# Patient Record
Sex: FEMALE | Race: BLACK OR AFRICAN AMERICAN | NOT HISPANIC OR LATINO | Employment: UNEMPLOYED | URBAN - METROPOLITAN AREA
[De-identification: names, ages, dates, MRNs, and addresses within clinical notes are randomized per-mention and may not be internally consistent; named-entity substitution may affect disease eponyms.]

---
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
## 2020-01-27 ENCOUNTER — HOSPITAL ENCOUNTER (EMERGENCY)
Facility: HOSPITAL | Age: 20
Discharge: HOME | End: 2020-01-27
Attending: EMERGENCY MEDICINE | Admitting: EMERGENCY MEDICINE

## 2020-01-27 VITALS
DIASTOLIC BLOOD PRESSURE: 58 MMHG | BODY MASS INDEX: 19.94 KG/M2 | SYSTOLIC BLOOD PRESSURE: 107 MMHG | HEIGHT: 60 IN | RESPIRATION RATE: 16 BRPM | WEIGHT: 101.6 LBS | HEART RATE: 72 BPM | TEMPERATURE: 97.6 F | OXYGEN SATURATION: 99 %

## 2020-01-27 DIAGNOSIS — N39.0 URINARY TRACT INFECTION WITHOUT HEMATURIA, SITE UNSPECIFIED: Primary | ICD-10-CM

## 2020-01-27 LAB
AMORPH URATE CRY URNS QL MICRO: ABNORMAL /HPF
B-HCG UR QL: NEGATIVE
BACTERIA UR QL AUTO: ABNORMAL /HPF
BILIRUB UR QL STRIP: NEGATIVE
CLARITY UR: CLEAR
COLOR UR: YELLOW
GLUCOSE UR STRIP-MCNC: NEGATIVE MG/DL
HGB UR QL STRIP.AUTO: ABNORMAL
HYALINE CASTS UR QL AUTO: ABNORMAL /LPF
KETONES UR QL STRIP: NEGATIVE
LEUKOCYTE ESTERASE UR QL STRIP.AUTO: ABNORMAL
NITRITE UR QL STRIP: NEGATIVE
PH UR STRIP.AUTO: <=5 [PH] (ref 4.5–8)
PROT UR QL STRIP: NEGATIVE
RBC # UR: ABNORMAL /HPF
REF LAB TEST METHOD: ABNORMAL
SP GR UR STRIP: <=1.005 (ref 1–1.03)
SQUAMOUS #/AREA URNS HPF: ABNORMAL /HPF
UROBILINOGEN UR QL STRIP: ABNORMAL
WBC UR QL AUTO: ABNORMAL /HPF

## 2020-01-27 PROCEDURE — 99282 EMERGENCY DEPT VISIT SF MDM: CPT | Performed by: EMERGENCY MEDICINE

## 2020-01-27 PROCEDURE — 81025 URINE PREGNANCY TEST: CPT | Performed by: EMERGENCY MEDICINE

## 2020-01-27 PROCEDURE — 81001 URINALYSIS AUTO W/SCOPE: CPT | Performed by: EMERGENCY MEDICINE

## 2020-01-27 PROCEDURE — 99283 EMERGENCY DEPT VISIT LOW MDM: CPT

## 2020-01-27 RX ORDER — CEFUROXIME AXETIL 250 MG/1
500 TABLET ORAL ONCE
Status: COMPLETED | OUTPATIENT
Start: 2020-01-27 | End: 2020-01-27

## 2020-01-27 RX ORDER — CEFUROXIME AXETIL 250 MG/1
250 TABLET ORAL 2 TIMES DAILY
Qty: 14 TABLET | Refills: 0 | Status: SHIPPED | OUTPATIENT
Start: 2020-01-27 | End: 2020-02-03

## 2020-01-27 RX ADMIN — CEFUROXIME AXETIL 500 MG: 250 TABLET, FILM COATED ORAL at 23:27

## 2020-01-28 NOTE — DISCHARGE INSTRUCTIONS
Take antibiotic medication as directed until it is completed.  Drink plenty water as tolerated.  Please return to the emergency room for any worsening pain, fevers, nausea, vomiting, difficulties urinating or any other concerns.

## 2020-01-28 NOTE — ED PROVIDER NOTES
Subjective   History of Present Illness  History of Present Illness    Chief complaint: Dysuria, vaginal pain    Location: Vagina    Quality/Severity: Moderate pain, burning, pressure    Timing/Duration: Present for 1 day    Modifying Factors: Worse whenever urinating    Narrative: This patient presents for evaluation of dysuria symptoms.  She has had some vaginal discomfort with burning and pressure in that area especially with urination today.  She has not had any fevers.  She has not any vaginal bleeding or vaginal discharge.  She has not had any nausea or vomiting.  Due to medical reasons, she is never had a menstrual cycle so far.  She has also never had intercourse with anyone in the past and denies any risk factors for STDs.  She says the symptoms today feel similar to a previous UTI problem she has had in the past.  She does have a history of adrenal insufficiency and takes steroid medications daily for that condition.  She is not currently taking any antibiotics.    Associated Symptoms: As above    Review of Systems   Constitutional: Negative for activity change, diaphoresis and fever.   Respiratory: Negative for shortness of breath.    Cardiovascular: Negative for chest pain.   Gastrointestinal: Negative for abdominal pain, blood in stool, constipation, diarrhea, nausea and vomiting.   Genitourinary: Positive for dysuria, urgency and vaginal pain. Negative for decreased urine volume, flank pain, hematuria, vaginal bleeding and vaginal discharge.   Skin: Negative for color change and rash.   Neurological: Negative for syncope.   All other systems reviewed and are negative.      Past Medical History:   Diagnosis Date   • Adrenal insufficiency (CMS/HCC)    • Brain cancer (CMS/HCC)     dx at age 8   • Dwarfism, pituitary (CMS/HCC)    • Growth disorder    • Hypertension    • Hypothyroid        No Known Allergies    Past Surgical History:   Procedure Laterality Date   • SHUNT REMOVAL         History reviewed. No  pertinent family history.    Social History     Socioeconomic History   • Marital status: Single     Spouse name: Not on file   • Number of children: Not on file   • Years of education: Not on file   • Highest education level: Not on file   Tobacco Use   • Smoking status: Never Smoker   Substance and Sexual Activity   • Alcohol use: Never     Frequency: Never   • Drug use: Never   • Sexual activity: Not Currently       ED Triage Vitals [01/27/20 2237]   Temp Heart Rate Resp BP SpO2   97.6 °F (36.4 °C) 72 16 121/69 96 %      Temp src Heart Rate Source Patient Position BP Location FiO2 (%)   Oral Monitor Sitting Right arm --         Objective   Physical Exam   Constitutional: She is oriented to person, place, and time. She appears well-developed and well-nourished. No distress.   Short stature, delayed development   HENT:   Head: Normocephalic and atraumatic.   Eyes: Pupils are equal, round, and reactive to light. EOM are normal. Right eye exhibits no discharge. Left eye exhibits no discharge.   Neck: Normal range of motion. Neck supple.   Cardiovascular: Normal rate and intact distal pulses.   Pulmonary/Chest: Effort normal. No respiratory distress.   Abdominal: Soft. She exhibits no distension and no mass. There is no tenderness. There is no rebound and no guarding. No hernia.   Abdomen is soft and benign throughout.  No peritoneal signs anywhere.   Musculoskeletal: Normal range of motion. She exhibits no edema or deformity.   Neurological: She is alert and oriented to person, place, and time. She exhibits normal muscle tone. Coordination normal.   Skin: Skin is warm and dry. No rash noted. She is not diaphoretic. No erythema.   Psychiatric: She has a normal mood and affect. Her behavior is normal. Judgment and thought content normal.   Nursing note and vitals reviewed.    Results for orders placed or performed during the hospital encounter of 01/27/20   Pregnancy, Urine - Urine, Clean Catch   Result Value Ref Range  "   HCG, Urine QL Negative Negative   Urinalysis With Microscopic If Indicated (No Culture) - Urine, Clean Catch   Result Value Ref Range    Color, UA Yellow Yellow, Straw    Appearance, UA Clear Clear    pH, UA <=5.0 4.5 - 8.0    Specific Gravity, UA <=1.005 1.003 - 1.030    Glucose, UA Negative Negative    Ketones, UA Negative Negative    Bilirubin, UA Negative Negative    Blood, UA Trace (A) Negative    Protein, UA Negative Negative    Leuk Esterase, UA Moderate (2+) (A) Negative    Nitrite, UA Negative Negative    Urobilinogen, UA 0.2 E.U./dL 0.2 - 1.0 E.U./dL       Procedures           ED Course  ED Course as of Jan 27 2330   Mon Jan 27, 2020 2329 I have reviewed the urinalysis results.  Patient presents with symptoms that are rather classic for UTI concerns here.  I will go ahead and treat her with a course of Ceftin.  I advised her to follow-up with her primary care doctor this week.  I reviewed with her the usual \"return to ER\" instructions for any worsening signs or symptoms.  She was discharged home in good condition after that.    [JAI]      ED Course User Index  [JAI] Anthony Harris MD                                               [facility]    Final diagnoses:   Urinary tract infection without hematuria, site unspecified            Anthony Harris MD  01/27/20 2320    "

## 2021-11-29 ENCOUNTER — TELEPHONE (OUTPATIENT)
Dept: URGENT CARE | Facility: CLINIC | Age: 21
End: 2021-11-29

## 2021-11-29 DIAGNOSIS — B96.89 BACTERIAL VAGINOSIS: Primary | ICD-10-CM

## 2021-11-29 DIAGNOSIS — N76.0 BACTERIAL VAGINOSIS: Primary | ICD-10-CM

## 2021-11-29 RX ORDER — METRONIDAZOLE 500 MG/1
500 TABLET ORAL 2 TIMES DAILY
Qty: 14 TABLET | Refills: 0 | Status: SHIPPED | OUTPATIENT
Start: 2021-11-29 | End: 2021-12-06

## 2023-08-29 ENCOUNTER — HOSPITAL ENCOUNTER (EMERGENCY)
Facility: HOSPITAL | Age: 23
Discharge: HOME | End: 2023-08-29
Attending: EMERGENCY MEDICINE

## 2023-08-29 VITALS
SYSTOLIC BLOOD PRESSURE: 119 MMHG | TEMPERATURE: 98.9 F | RESPIRATION RATE: 16 BRPM | DIASTOLIC BLOOD PRESSURE: 77 MMHG | HEART RATE: 91 BPM | OXYGEN SATURATION: 93 %

## 2023-08-29 DIAGNOSIS — J02.9 ACUTE VIRAL PHARYNGITIS: Primary | ICD-10-CM

## 2023-08-29 LAB
HETEROPH AB SER QL LA: NEGATIVE
S PYO AG THROAT QL: NEGATIVE

## 2023-08-29 PROCEDURE — 36415 COLL VENOUS BLD VENIPUNCTURE: CPT

## 2023-08-29 PROCEDURE — 87081 CULTURE SCREEN ONLY: CPT | Performed by: EMERGENCY MEDICINE

## 2023-08-29 PROCEDURE — 87880 STREP A ASSAY W/OPTIC: CPT | Performed by: EMERGENCY MEDICINE

## 2023-08-29 PROCEDURE — 86308 HETEROPHILE ANTIBODY SCREEN: CPT | Performed by: EMERGENCY MEDICINE

## 2023-08-29 PROCEDURE — 63710000001 PREDNISONE PER 1 MG: Performed by: EMERGENCY MEDICINE

## 2023-08-29 PROCEDURE — 99283 EMERGENCY DEPT VISIT LOW MDM: CPT

## 2023-08-29 RX ORDER — PREDNISONE 20 MG/1
60 TABLET ORAL ONCE
Status: COMPLETED | OUTPATIENT
Start: 2023-08-29 | End: 2023-08-29

## 2023-08-29 RX ORDER — METHYLPREDNISOLONE 4 MG/1
TABLET ORAL
Qty: 21 TABLET | Refills: 0 | Status: SHIPPED | OUTPATIENT
Start: 2023-08-29

## 2023-08-29 RX ADMIN — PREDNISONE 60 MG: 20 TABLET ORAL at 20:33

## 2023-09-01 LAB — BACTERIA SPEC AEROBE CULT: NORMAL

## 2023-11-26 ENCOUNTER — HOSPITAL ENCOUNTER (OUTPATIENT)
Facility: HOSPITAL | Age: 23
Setting detail: OBSERVATION
Discharge: HOME | End: 2023-11-27
Attending: EMERGENCY MEDICINE | Admitting: HOSPITALIST

## 2023-11-26 ENCOUNTER — APPOINTMENT (OUTPATIENT)
Dept: GENERAL RADIOLOGY | Facility: HOSPITAL | Age: 23
End: 2023-11-26

## 2023-11-26 DIAGNOSIS — U07.1 COVID-19 VIRUS INFECTION: Primary | ICD-10-CM

## 2023-11-26 DIAGNOSIS — E27.40 ADRENAL INSUFFICIENCY: ICD-10-CM

## 2023-11-26 PROBLEM — A41.89 SEPSIS DUE TO COVID-19: Status: ACTIVE | Noted: 2023-11-26

## 2023-11-26 LAB
ALBUMIN SERPL-MCNC: 3.8 G/DL (ref 3.5–5.2)
ALBUMIN/GLOB SERPL: 1.1 G/DL
ALP SERPL-CCNC: 133 U/L (ref 39–117)
ALT SERPL W P-5'-P-CCNC: 12 U/L (ref 1–33)
ANION GAP SERPL CALCULATED.3IONS-SCNC: 18.6 MMOL/L (ref 5–15)
ANION GAP SERPL CALCULATED.3IONS-SCNC: 9.4 MMOL/L (ref 5–15)
AST SERPL-CCNC: 32 U/L (ref 1–32)
BACTERIA UR QL AUTO: ABNORMAL /HPF
BASOPHILS # BLD AUTO: 0.02 10*3/MM3 (ref 0–0.2)
BASOPHILS NFR BLD AUTO: 0.2 % (ref 0–1.5)
BILIRUB SERPL-MCNC: 0.5 MG/DL (ref 0–1.2)
BILIRUB UR QL STRIP: NEGATIVE
BUN SERPL-MCNC: 11 MG/DL (ref 6–20)
BUN SERPL-MCNC: 15 MG/DL (ref 6–20)
BUN/CREAT SERPL: 12.9 (ref 7–25)
BUN/CREAT SERPL: 16.3 (ref 7–25)
CALCIUM SPEC-SCNC: 8.4 MG/DL (ref 8.6–10.5)
CALCIUM SPEC-SCNC: 9.2 MG/DL (ref 8.6–10.5)
CHLORIDE SERPL-SCNC: 101 MMOL/L (ref 98–107)
CHLORIDE SERPL-SCNC: 117 MMOL/L (ref 98–107)
CLARITY UR: CLEAR
CO2 SERPL-SCNC: 15.4 MMOL/L (ref 22–29)
CO2 SERPL-SCNC: 16.6 MMOL/L (ref 22–29)
COLOR UR: YELLOW
CREAT SERPL-MCNC: 0.85 MG/DL (ref 0.57–1)
CREAT SERPL-MCNC: 0.92 MG/DL (ref 0.57–1)
D-LACTATE SERPL-SCNC: 1.8 MMOL/L (ref 0.5–2)
DEPRECATED RDW RBC AUTO: 39.1 FL (ref 37–54)
EGFRCR SERPLBLD CKD-EPI 2021: 89.9 ML/MIN/1.73
EGFRCR SERPLBLD CKD-EPI 2021: 98.9 ML/MIN/1.73
EOSINOPHIL # BLD AUTO: 0.1 10*3/MM3 (ref 0–0.4)
EOSINOPHIL NFR BLD AUTO: 1.2 % (ref 0.3–6.2)
ERYTHROCYTE [DISTWIDTH] IN BLOOD BY AUTOMATED COUNT: 11.1 % (ref 12.3–15.4)
FERRITIN SERPL-MCNC: 139 NG/ML (ref 13–150)
GLOBULIN UR ELPH-MCNC: 3.4 GM/DL
GLUCOSE BLDC GLUCOMTR-MCNC: 201 MG/DL (ref 70–130)
GLUCOSE BLDC GLUCOMTR-MCNC: 224 MG/DL (ref 70–130)
GLUCOSE SERPL-MCNC: 237 MG/DL (ref 65–99)
GLUCOSE SERPL-MCNC: 67 MG/DL (ref 65–99)
GLUCOSE UR STRIP-MCNC: NEGATIVE MG/DL
HCT VFR BLD AUTO: 44.4 % (ref 34–46.6)
HGB BLD-MCNC: 14.2 G/DL (ref 12–15.9)
HGB UR QL STRIP.AUTO: ABNORMAL
HYALINE CASTS UR QL AUTO: ABNORMAL /LPF
IMM GRANULOCYTES # BLD AUTO: 0.02 10*3/MM3 (ref 0–0.05)
IMM GRANULOCYTES NFR BLD AUTO: 0.2 % (ref 0–0.5)
KETONES UR QL STRIP: ABNORMAL
LDH SERPL-CCNC: 283 U/L (ref 135–214)
LEUKOCYTE ESTERASE UR QL STRIP.AUTO: NEGATIVE
LYMPHOCYTES # BLD AUTO: 2.02 10*3/MM3 (ref 0.7–3.1)
LYMPHOCYTES NFR BLD AUTO: 24 % (ref 19.6–45.3)
MCH RBC QN AUTO: 29.8 PG (ref 26.6–33)
MCHC RBC AUTO-ENTMCNC: 32 G/DL (ref 31.5–35.7)
MCV RBC AUTO: 93.1 FL (ref 79–97)
MONOCYTES # BLD AUTO: 1.19 10*3/MM3 (ref 0.1–0.9)
MONOCYTES NFR BLD AUTO: 14.1 % (ref 5–12)
NEUTROPHILS NFR BLD AUTO: 5.06 10*3/MM3 (ref 1.7–7)
NEUTROPHILS NFR BLD AUTO: 60.3 % (ref 42.7–76)
NITRITE UR QL STRIP: NEGATIVE
PH UR STRIP.AUTO: <=5 [PH] (ref 4.5–8)
PLATELET # BLD AUTO: 170 10*3/MM3 (ref 140–450)
PMV BLD AUTO: 12.9 FL (ref 6–12)
POTASSIUM SERPL-SCNC: 4 MMOL/L (ref 3.5–5.2)
POTASSIUM SERPL-SCNC: 4.3 MMOL/L (ref 3.5–5.2)
PROCALCITONIN SERPL-MCNC: 0.18 NG/ML (ref 0–0.25)
PROT SERPL-MCNC: 7.2 G/DL (ref 6–8.5)
PROT UR QL STRIP: NEGATIVE
RBC # BLD AUTO: 4.77 10*6/MM3 (ref 3.77–5.28)
RBC # UR STRIP: ABNORMAL /HPF
REF LAB TEST METHOD: ABNORMAL
S PYO AG THROAT QL: NEGATIVE
SARS-COV-2 RNA RESP QL NAA+PROBE: DETECTED
SODIUM SERPL-SCNC: 135 MMOL/L (ref 136–145)
SODIUM SERPL-SCNC: 143 MMOL/L (ref 136–145)
SP GR UR STRIP: <=1.005 (ref 1–1.03)
SQUAMOUS #/AREA URNS HPF: ABNORMAL /HPF
UROBILINOGEN UR QL STRIP: ABNORMAL
WBC # UR STRIP: ABNORMAL /HPF
WBC NRBC COR # BLD AUTO: 8.41 10*3/MM3 (ref 3.4–10.8)

## 2023-11-26 PROCEDURE — 96361 HYDRATE IV INFUSION ADD-ON: CPT

## 2023-11-26 PROCEDURE — 63710000001 INSULIN ASPART PER 5 UNITS: Performed by: STUDENT IN AN ORGANIZED HEALTH CARE EDUCATION/TRAINING PROGRAM

## 2023-11-26 PROCEDURE — 87040 BLOOD CULTURE FOR BACTERIA: CPT | Performed by: STUDENT IN AN ORGANIZED HEALTH CARE EDUCATION/TRAINING PROGRAM

## 2023-11-26 PROCEDURE — 87635 SARS-COV-2 COVID-19 AMP PRB: CPT | Performed by: EMERGENCY MEDICINE

## 2023-11-26 PROCEDURE — 99223 1ST HOSP IP/OBS HIGH 75: CPT | Performed by: STUDENT IN AN ORGANIZED HEALTH CARE EDUCATION/TRAINING PROGRAM

## 2023-11-26 PROCEDURE — 94799 UNLISTED PULMONARY SVC/PX: CPT

## 2023-11-26 PROCEDURE — 96374 THER/PROPH/DIAG INJ IV PUSH: CPT

## 2023-11-26 PROCEDURE — 83615 LACTATE (LD) (LDH) ENZYME: CPT | Performed by: EMERGENCY MEDICINE

## 2023-11-26 PROCEDURE — 36415 COLL VENOUS BLD VENIPUNCTURE: CPT

## 2023-11-26 PROCEDURE — G0378 HOSPITAL OBSERVATION PER HR: HCPCS

## 2023-11-26 PROCEDURE — 99284 EMERGENCY DEPT VISIT MOD MDM: CPT

## 2023-11-26 PROCEDURE — 83605 ASSAY OF LACTIC ACID: CPT | Performed by: EMERGENCY MEDICINE

## 2023-11-26 PROCEDURE — 71045 X-RAY EXAM CHEST 1 VIEW: CPT

## 2023-11-26 PROCEDURE — 81001 URINALYSIS AUTO W/SCOPE: CPT | Performed by: EMERGENCY MEDICINE

## 2023-11-26 PROCEDURE — 85025 COMPLETE CBC W/AUTO DIFF WBC: CPT | Performed by: EMERGENCY MEDICINE

## 2023-11-26 PROCEDURE — 82728 ASSAY OF FERRITIN: CPT | Performed by: EMERGENCY MEDICINE

## 2023-11-26 PROCEDURE — 80053 COMPREHEN METABOLIC PANEL: CPT | Performed by: EMERGENCY MEDICINE

## 2023-11-26 PROCEDURE — 87880 STREP A ASSAY W/OPTIC: CPT

## 2023-11-26 PROCEDURE — 84145 PROCALCITONIN (PCT): CPT | Performed by: EMERGENCY MEDICINE

## 2023-11-26 PROCEDURE — 87081 CULTURE SCREEN ONLY: CPT | Performed by: EMERGENCY MEDICINE

## 2023-11-26 PROCEDURE — 25810000003 LACTATED RINGERS PER 1000 ML: Performed by: STUDENT IN AN ORGANIZED HEALTH CARE EDUCATION/TRAINING PROGRAM

## 2023-11-26 PROCEDURE — 25010000002 HYDROCORTISONE SOD SUC (PF) 100 MG RECONSTITUTED SOLUTION: Performed by: EMERGENCY MEDICINE

## 2023-11-26 PROCEDURE — 82948 REAGENT STRIP/BLOOD GLUCOSE: CPT

## 2023-11-26 PROCEDURE — 25810000003 DEXTROSE-NACL PER 500 ML: Performed by: EMERGENCY MEDICINE

## 2023-11-26 PROCEDURE — 25810000003 SODIUM CHLORIDE 0.9 % SOLUTION: Performed by: EMERGENCY MEDICINE

## 2023-11-26 RX ORDER — HYDROCORTISONE 10 MG/1
15 TABLET ORAL
Status: DISCONTINUED | OUTPATIENT
Start: 2023-11-26 | End: 2023-11-27 | Stop reason: HOSPADM

## 2023-11-26 RX ORDER — DEXTROSE AND SODIUM CHLORIDE 5; .9 G/100ML; G/100ML
125 INJECTION, SOLUTION INTRAVENOUS CONTINUOUS
Status: DISCONTINUED | OUTPATIENT
Start: 2023-11-26 | End: 2023-11-26

## 2023-11-26 RX ORDER — SODIUM CHLORIDE, SODIUM LACTATE, POTASSIUM CHLORIDE, CALCIUM CHLORIDE 600; 310; 30; 20 MG/100ML; MG/100ML; MG/100ML; MG/100ML
125 INJECTION, SOLUTION INTRAVENOUS CONTINUOUS
Status: DISCONTINUED | OUTPATIENT
Start: 2023-11-26 | End: 2023-11-27 | Stop reason: HOSPADM

## 2023-11-26 RX ORDER — SODIUM CHLORIDE 0.9 % (FLUSH) 0.9 %
10 SYRINGE (ML) INJECTION AS NEEDED
Status: DISCONTINUED | OUTPATIENT
Start: 2023-11-26 | End: 2023-11-27 | Stop reason: HOSPADM

## 2023-11-26 RX ORDER — DESMOPRESSIN ACETATE 0.1 MG/1
100 TABLET ORAL DAILY
Status: DISCONTINUED | OUTPATIENT
Start: 2023-11-27 | End: 2023-11-27 | Stop reason: HOSPADM

## 2023-11-26 RX ORDER — ACETAMINOPHEN 160 MG/5ML
650 SOLUTION ORAL EVERY 4 HOURS PRN
Status: DISCONTINUED | OUTPATIENT
Start: 2023-11-26 | End: 2023-11-27 | Stop reason: HOSPADM

## 2023-11-26 RX ORDER — NICOTINE POLACRILEX 4 MG
15 LOZENGE BUCCAL
Status: DISCONTINUED | OUTPATIENT
Start: 2023-11-26 | End: 2023-11-27 | Stop reason: HOSPADM

## 2023-11-26 RX ORDER — ACETAMINOPHEN 500 MG
TABLET ORAL
Status: COMPLETED
Start: 2023-11-26 | End: 2023-11-26

## 2023-11-26 RX ORDER — NORGESTIMATE AND ETHINYL ESTRADIOL 0.25-0.035
1 KIT ORAL DAILY
COMMUNITY
Start: 2023-11-20

## 2023-11-26 RX ORDER — INSULIN ASPART 100 [IU]/ML
2-7 INJECTION, SOLUTION INTRAVENOUS; SUBCUTANEOUS
Status: DISCONTINUED | OUTPATIENT
Start: 2023-11-26 | End: 2023-11-27 | Stop reason: HOSPADM

## 2023-11-26 RX ORDER — DESMOPRESSIN ACETATE 0.1 MG/1
100 TABLET ORAL DAILY
Status: DISCONTINUED | OUTPATIENT
Start: 2023-11-26 | End: 2023-11-26

## 2023-11-26 RX ORDER — IBUPROFEN 600 MG/1
1 TABLET ORAL
Status: DISCONTINUED | OUTPATIENT
Start: 2023-11-26 | End: 2023-11-27 | Stop reason: HOSPADM

## 2023-11-26 RX ORDER — LEVOTHYROXINE SODIUM 88 UG/1
88 TABLET ORAL
Status: DISCONTINUED | OUTPATIENT
Start: 2023-11-27 | End: 2023-11-27 | Stop reason: HOSPADM

## 2023-11-26 RX ORDER — NITROGLYCERIN 0.4 MG/1
0.4 TABLET SUBLINGUAL
Status: DISCONTINUED | OUTPATIENT
Start: 2023-11-26 | End: 2023-11-27 | Stop reason: HOSPADM

## 2023-11-26 RX ORDER — HYDROCORTISONE 10 MG/1
20 TABLET ORAL EVERY 8 HOURS
Status: DISCONTINUED | OUTPATIENT
Start: 2023-11-26 | End: 2023-11-26

## 2023-11-26 RX ORDER — DEXTROSE MONOHYDRATE 25 G/50ML
25 INJECTION, SOLUTION INTRAVENOUS
Status: DISCONTINUED | OUTPATIENT
Start: 2023-11-26 | End: 2023-11-27 | Stop reason: HOSPADM

## 2023-11-26 RX ORDER — DESMOPRESSIN ACETATE 0.1 MG/1
50 TABLET ORAL
Status: DISCONTINUED | OUTPATIENT
Start: 2023-11-26 | End: 2023-11-27 | Stop reason: HOSPADM

## 2023-11-26 RX ORDER — SODIUM CHLORIDE 9 MG/ML
INJECTION, SOLUTION INTRAVENOUS
Status: DISPENSED
Start: 2023-11-26 | End: 2023-11-26

## 2023-11-26 RX ORDER — ACETAMINOPHEN 500 MG
1000 TABLET ORAL ONCE
Status: COMPLETED | OUTPATIENT
Start: 2023-11-26 | End: 2023-11-26

## 2023-11-26 RX ADMIN — INSULIN ASPART 3 UNITS: 100 INJECTION, SOLUTION INTRAVENOUS; SUBCUTANEOUS at 20:56

## 2023-11-26 RX ADMIN — SODIUM CHLORIDE, POTASSIUM CHLORIDE, SODIUM LACTATE AND CALCIUM CHLORIDE 125 ML/HR: 600; 310; 30; 20 INJECTION, SOLUTION INTRAVENOUS at 14:39

## 2023-11-26 RX ADMIN — SODIUM CHLORIDE, POTASSIUM CHLORIDE, SODIUM LACTATE AND CALCIUM CHLORIDE 125 ML/HR: 600; 310; 30; 20 INJECTION, SOLUTION INTRAVENOUS at 21:12

## 2023-11-26 RX ADMIN — ACETAMINOPHEN 1000 MG: 500 TABLET ORAL at 07:54

## 2023-11-26 RX ADMIN — NIRMATRELVIR AND RITONAVIR 3 TABLET: KIT at 21:12

## 2023-11-26 RX ADMIN — ACETAMINOPHEN 650 MG: 325 SOLUTION ORAL at 21:12

## 2023-11-26 RX ADMIN — ACETAMINOPHEN 1000 MG: 500 TABLET ORAL at 07:55

## 2023-11-26 RX ADMIN — HYDROCORTISONE 15 MG: 10 TABLET ORAL at 17:21

## 2023-11-26 RX ADMIN — SODIUM CHLORIDE 1000 ML: 9 INJECTION, SOLUTION INTRAVENOUS at 09:46

## 2023-11-26 RX ADMIN — HYDROCORTISONE SODIUM SUCCINATE 250 MG: 100 INJECTION, POWDER, FOR SOLUTION INTRAMUSCULAR; INTRAVENOUS at 09:38

## 2023-11-26 RX ADMIN — INSULIN ASPART 3 UNITS: 100 INJECTION, SOLUTION INTRAVENOUS; SUBCUTANEOUS at 17:37

## 2023-11-26 RX ADMIN — DESMOPRESSIN ACETATE 50 MCG: 0.1 TABLET ORAL at 20:53

## 2023-11-26 RX ADMIN — DESMOPRESSIN ACETATE 50 MCG: 0.1 TABLET ORAL at 16:37

## 2023-11-26 RX ADMIN — DEXTROSE AND SODIUM CHLORIDE 125 ML/HR: 5; 900 INJECTION, SOLUTION INTRAVENOUS at 09:35

## 2023-11-26 RX ADMIN — HYDROCORTISONE 20 MG: 10 TABLET ORAL at 13:55

## 2023-11-26 NOTE — ED PROVIDER NOTES
"Subjective   History of Present Illness  History of Present Illness    Chief complaint: Fever, cough, sore throat.    Location: Upper respiratory tract    Quality/Severity: The patient has had a fever to 100.8, a sore throat, cough productive of yellow sputum, runny nose, she has been dizzy and lightheaded.    Timing/Onset: Started 3 days ago.    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    Associated symptoms: Denies shortness of breath.  Reports severe fatigue.    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  She has central hypothyroid.  Diabetes insipidus and secondary adrenal insufficiency.  Her last menstrual period was 2 weeks ago and she is not sexually active.  She is a non-smoker.  She works as a  at [employer].    Review of Systems  Past Medical History:   Diagnosis Date    Adrenal insufficiency     Brain cancer     dx at age 8    Dwarfism, pituitary     Growth disorder     Hypertension     Hypothyroid      /63   Pulse 91   Temp 99 °F (37.2 °C) (Oral)   Resp 18   Ht 157.5 cm (62\")   Wt 56.7 kg (125 lb)   LMP  (LMP Unknown)   SpO2 97%   BMI 22.86 kg/m²     Past Medical History:   Diagnosis Date    Adrenal insufficiency     Brain cancer     dx at age 8    Dwarfism, pituitary     Growth disorder     Hypertension     Hypothyroid        The primary encounter diagnosis was COVID-19 virus infection. A diagnosis of Adrenal insufficiency was also pertinent to this visit.  XR Chest 1 View   Final Result   No acute cardiopulmonary findings.       This report was finalized on 11/26/2023 8:59 AM by Dr. Jimmy Hoffman MD.            Final diagnoses:   COVID-19 virus infection   Adrenal insufficiency     No Known Allergies    Past Surgical History:   Procedure " Laterality Date    SHUNT REMOVAL         History reviewed. No pertinent family history.    Social History     Socioeconomic History    Marital status: Single   Tobacco Use    Smoking status: Never    Smokeless tobacco: Never   Vaping Use    Vaping Use: Never used   Substance and Sexual Activity    Alcohol use: Never    Drug use: Never    Sexual activity: Not Currently           Objective   Physical Exam  Vitals and nursing note reviewed.   Constitutional:       General: She is not in acute distress.     Appearance: She is well-developed and normal weight. She is ill-appearing. She is not toxic-appearing or diaphoretic.      Comments: The patient appears acutely ill.  Review of her vital signs: She is febrile with a temperature 103.1, tachycardic with a heart rate of 130, blood pressure normal 125/85, respirations normal 18 with a normal room air oxygen saturation 98%.   HENT:      Head: Normocephalic and atraumatic.      Nose: Nose normal.      Mouth/Throat:      Mouth: Mucous membranes are moist. No oral lesions.      Pharynx: No pharyngeal swelling, oropharyngeal exudate or posterior oropharyngeal erythema.   Eyes:      General: No scleral icterus.        Right eye: No discharge.         Left eye: No discharge.      Pupils: Pupils are equal, round, and reactive to light.   Neck:      Thyroid: No thyromegaly.      Vascular: No JVD.   Cardiovascular:      Rate and Rhythm: Normal rate and regular rhythm.      Heart sounds: Normal heart sounds. No murmur heard.  Pulmonary:      Effort: Pulmonary effort is normal.      Breath sounds: Normal breath sounds. No wheezing, rhonchi or rales.   Chest:      Chest wall: No tenderness.   Abdominal:      General: Bowel sounds are normal. There is no distension.      Palpations: Abdomen is soft.      Tenderness: There is no abdominal tenderness. There is no right CVA tenderness, left CVA tenderness or guarding.   Musculoskeletal:         General: No tenderness or deformity. Normal  range of motion.      Cervical back: Normal range of motion and neck supple. No tenderness.   Lymphadenopathy:      Cervical: No cervical adenopathy.   Skin:     General: Skin is warm and dry.      Capillary Refill: Capillary refill takes less than 2 seconds.      Coloration: Skin is not pale.      Findings: No erythema or rash.   Neurological:      General: No focal deficit present.      Mental Status: She is alert and oriented to person, place, and time.      Cranial Nerves: No cranial nerve deficit.      Coordination: Coordination normal.      Comments: No focal motor sensory deficit   Psychiatric:         Mood and Affect: Mood normal.         Behavior: Behavior normal.         Thought Content: Thought content normal.         Judgment: Judgment normal.         Procedures           ED Course  ED Course as of 11/26/23 1046   Sun Nov 26, 2023   0840 The patient was administered Tylenol 1 g p.o. for fever.  Due to her history of secondary adrenal insufficiency she was administered Solu-Cortef 250 mg IV.  She will be administered IV D5 normal saline. [TP]   0840 The patient's rapid strep screen is negative.  COVID-19 PCR is positive. [TP]   0927 The patient's chest x-ray was interpreted by me and the radiologist as no acute disease. [TP]   1043 Due to the patient's acute COVID infection being complicated by her history of adrenal insufficiency, and her apparent lack of understanding of her adrenal insufficiency, and appearing acutely ill, I am concerned for the risk of the patient developing worsening adrenal sufficiency or adrenal crisis due to the acute COVID infection.  At 10: 20 the patient was discussed with the hospitalist Dr. Wallis who agreed to admit the patient to observation. [TP]      ED Course User Index  [TP] Bran Doyle MD                                           Medical Decision Making  My differential diagnosis for cough includes but is not limited to:  Upper respiratory infection, bronchitis,  pneumonia, COPD exacerbation, cough variant asthma, cardiac asthma, coronary artery disease, congestive heart failure, bacterial, viral or lung infections, lung cancer, aspiration pneumonitis, aspiration of foreign body and Covid -19           Problems Addressed:  Adrenal insufficiency: complicated acute illness or injury  COVID-19 virus infection: complicated acute illness or injury    Amount and/or Complexity of Data Reviewed  Labs: ordered. Decision-making details documented in ED Course.  Radiology: ordered. Decision-making details documented in ED Course.  Discussion of management or test interpretation with external provider(s): Details documented in the ED course.    Risk  OTC drugs.  Prescription drug management.  Decision regarding hospitalization.        Final diagnoses:   COVID-19 virus infection   Adrenal insufficiency       ED Disposition  ED Disposition       ED Disposition   Decision to Admit    Condition   --    Comment   Level of Care: Telemetry [5]   Diagnosis: COVID-19 virus infection [[id number]]   Admitting Physician: NATASHA BHATIA [[id number]]   Bed Request Comments: Acute COVID-19 infection and adrenal insufficiency.                 No follow-up provider specified.       Medication List      No changes were made to your prescriptions during this visit.           Labs Reviewed   COVID-19,CEPHEID/GOLDIE,COR/ROEL/LAG/PAD/GORDON IN-HOUSE,NP SWAB IN TRANSPORT MEDIA 1 HR TAT, RT-PCR - Abnormal; Notable for the following components:       Result Value    COVID19 Detected (*)     All other components within normal limits    Narrative:     Fact sheet for providers: https://www.fda.gov/media/907701/download     Fact sheet for patients: https://www.fda.gov/media/849601/download  Fact sheet for providers: https://www.fda.gov/media/205814/download    Fact sheet for patients: https://www.fda.gov/media/197499/download    Test performed by PCR.   COMPREHENSIVE METABOLIC PANEL - Abnormal; Notable for the following  "components:    Sodium 135 (*)     CO2 15.4 (*)     Alkaline Phosphatase 133 (*)     Anion Gap 18.6 (*)     All other components within normal limits    Narrative:     GFR Normal >60  Chronic Kidney Disease <60  Kidney Failure <15     LACTATE DEHYDROGENASE - Abnormal; Notable for the following components:     (*)     All other components within normal limits   CBC WITH AUTO DIFFERENTIAL - Abnormal; Notable for the following components:    RDW 11.1 (*)     MPV 12.9 (*)     Monocyte % 14.1 (*)     Monocytes, Absolute 1.19 (*)     All other components within normal limits   URINALYSIS W/ MICROSCOPIC IF INDICATED (NO CULTURE) - Abnormal; Notable for the following components:    Ketones, UA 15 mg/dL (1+) (*)     Blood, UA Trace (*)     All other components within normal limits   URINALYSIS, MICROSCOPIC ONLY - Abnormal; Notable for the following components:    RBC, UA 3-5 (*)     All other components within normal limits   RAPID STREP A SCREEN - Normal   FERRITIN - Normal    Narrative:     Results may be falsely decreased if patient taking Biotin.     LACTIC ACID, PLASMA - Normal   PROCALCITONIN - Normal    Narrative:     As a Marker for Sepsis (Non-Neonates):    1. <0.5 ng/mL represents a low risk of severe sepsis and/or septic shock.  2. >2 ng/mL represents a high risk of severe sepsis and/or septic shock.    As a Marker for Lower Respiratory Tract Infections that require antibiotic therapy:    PCT on Admission    Antibiotic Therapy       6-12 Hrs later    >0.5                Strongly Recommended  >0.25 - <0.5        Recommended   0.1 - 0.25          Discouraged              Remeasure/reassess PCT  <0.1                Strongly Discouraged     Remeasure/reassess PCT    As 28 day mortality risk marker: \"Change in Procalcitonin Result\" (>80% or <=80%) if Day 0 (or Day 1) and Day 4 values are available. Refer to http://www.[employer]-pct-calculator.com    Change in PCT <=80%  A decrease of PCT levels below or equal to 80% " defines a positive change in PCT test result representing a higher risk for 28-day all-cause mortality of patients diagnosed with severe sepsis for septic shock.    Change in PCT >80%  A decrease of PCT levels of more than 80% defines a negative change in PCT result representing a lower risk for 28-day all-cause mortality of patients diagnosed with severe sepsis or septic shock.      BETA HEMOLYTIC STREP CULTURE, THROAT   CBC AND DIFFERENTIAL    Narrative:     The following orders were created for panel order CBC & Differential.  Procedure                               Abnormality         Status                     ---------                               -----------         ------                     CBC Auto Differential[[id number]]        Abnormal            Final result                 Please view results for these tests on the individual orders.     XR Chest 1 View   Final Result   No acute cardiopulmonary findings.       This report was finalized on 11/26/2023 8:59 AM by Dr. Jimmy Hoffman MD.                 Medication List      No changes were made to your prescriptions during this visit.              Bran Doyle MD  11/26/23 1046

## 2023-11-26 NOTE — H&P
[facility]    Patient Name:  Viviane Crawley  YOB: 2000  MRN:  [id number]  Admit Date:  11/26/2023  Patient Care Team:  Franklin Salcido MD as PCP - General (Family Medicine)      Subjective   History Present Illness     Chief Complaint   Patient presents with    Fever    Cough    Fatigue    Sore Throat    Dizziness       Ms. Crawley is a 23 y.o. 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      History of Present Illness  Patient is unable to provide a complete history but stated that a few days ago she started to feel a sore throat and it was hurting to swallow as well as some diarrhea.  Nothing was improving her symptoms and she was brought into the emergency department with her mother.    I spoke to mother on the telephone who said that patient was not feeling well did notice she was having some tiredness as well as a very difficult time swallowing given the severity of her sore throat.  Noted that patient drink up to a gallon of water yesterday and was not sure if this was because of COVID or because of the sore throat.  Mother has been providing patient with double her dose as a sick day rule although difficult to take because patient was having difficulty swallowing her pills.    Of note mother, Magalie, is primary caretaker for patient and administers all of her meds given some ongoing delay post surgical procedures.    Review of Systems   Constitutional:  Positive for appetite change, fatigue and fever.   HENT:  Positive for sore throat. Negative for sneezing.    Respiratory: Negative.     Cardiovascular: Negative.    Gastrointestinal: Negative.    Musculoskeletal: Negative.    Skin:  Negative.    Neurological: Negative.    All other systems reviewed and are negative.       Personal History     Past Medical History:   Diagnosis Date    Adrenal insufficiency     Brain cancer     dx at age 8    Dwarfism, pituitary     Growth disorder     Hypertension     Hypothyroid      Past Surgical History:   Procedure Laterality Date    SHUNT REMOVAL       History reviewed. No pertinent family history.  Social History     Tobacco Use    Smoking status: Never    Smokeless tobacco: Never   Vaping Use    Vaping Use: Never used   Substance Use Topics    Alcohol use: Never    Drug use: Never     No current facility-administered medications on file prior to encounter.     Current Outpatient Medications on File Prior to Encounter   Medication Sig Dispense Refill    acetaminophen (TYLENOL) 160 MG/5ML solution Take 20.3 mL by mouth Every 4 (Four) Hours As Needed for mild pain (1-3) or fever. 473 mL 0    desmopressin (DDAVP) 0.1 MG tablet Take 1 tablet by mouth Daily. 0.1 mg in the AM, 0.05 mg in the afternoon and at bedtime      Estarylla 0.25-35 MG-MCG per tablet Take 1 tablet by mouth Daily.      hydrocortisone (CORTEF) 10 MG tablet Take 0.5 tablets by mouth Daily. 1 tab in AM, 1 tab in the afternoon and 1/2 tab at night (8.6mg/m2)      ibuprofen (CHILD IBUPROFEN) 100 MG/5ML suspension Take 20 mL by mouth Every 6 (Six) Hours As Needed for mild pain (1-3) or fever. 473 mL 0    levothyroxine (SYNTHROID, LEVOTHROID) 88 MCG tablet Take 1 tablet by mouth Daily.      [DISCONTINUED] chlorhexidine (PERIDEX) 0.12 % solution Apply 15 mL to the mouth or throat 4 (Four) Times a Day. 473 mL 0    [DISCONTINUED] estradiol (MINIVELLE, VIVELLE-DOT) 0.05 MG/24HR patch APPLY 1 PATCH TOPICALLY TO THE SKIN 2 TIMES A WEEK AS DIRECTED      [DISCONTINUED] methylPREDNISolone (MEDROL) 4 MG dose pack follow package directions 21 tablet 0    [DISCONTINUED] nystatin-triamcinolone (MYCOLOG) 516602-7.1 UNIT/GM-% ointment APPLY TO THE AFFECTED AREA  TWICE DAILY FOR 10 DAYS       No Known Allergies    Objective    Objective     Vital Signs  Temp:  [98.3 °F (36.8 °C)-103.1 °F (39.5 °C)] 98.3 °F (36.8 °C)  Heart Rate:  [] 98  Resp:  [18] 18  BP: (105-125)/(63-85) 105/65  SpO2:  [97 %-100 %] 100 %  on   ;   Device (Oxygen Therapy): room air  Body mass index is 20.41 kg/m².    Physical Exam  Vitals and nursing note reviewed.   Constitutional:       General: She is not in acute distress.     Appearance: Normal appearance. She is ill-appearing.   HENT:      Head: Normocephalic and atraumatic.      Nose: Nose normal. No congestion or rhinorrhea.      Mouth/Throat:      Mouth: Mucous membranes are dry.   Eyes:      Extraocular Movements: Extraocular movements intact.      Conjunctiva/sclera: Conjunctivae normal.   Cardiovascular:      Rate and Rhythm: Normal rate and regular rhythm.      Pulses: Normal pulses.      Heart sounds: Normal heart sounds.   Pulmonary:      Effort: Pulmonary effort is normal. No respiratory distress.      Breath sounds: Normal breath sounds. No wheezing.   Abdominal:      General: Abdomen is flat. Bowel sounds are normal.      Palpations: Abdomen is soft.   Musculoskeletal:         General: Normal range of motion.   Skin:     General: Skin is warm and dry.   Neurological:      Mental Status: She is alert. Mental status is at baseline.       Results Review:  I reviewed the patient's new clinical results.  I reviewed the patient's new imaging results and agree with the interpretation.  I reviewed the patient's other test results and agree with the interpretation  I personally viewed and interpreted the patient's EKG/Telemetry data  Discussed with ED provider.    Lab Results (last 24 hours)       Procedure Component Value Units Date/Time    COVID-19,CEPHEID/GOLDIE,COR/ROEL/PAD/GORDON/LAG IN-HOUSE,NP SWAB IN TRANSPORT MEDIA 1 HR TAT, RT-PCR - Swab, Nasopharynx [[id number]]  (Abnormal) Collected: 11/26/23 0754    Specimen: Swab from Nasopharynx  Updated: 11/26/23 0831     COVID19 Detected    Narrative:      Fact sheet for providers: https://www.fda.gov/media/615539/download     Fact sheet for patients: https://www.fda.gov/media/958006/download  Fact sheet for providers: https://www.fda.gov/media/434986/download    Fact sheet for patients: https://www.fda.gov/media/691062/download    Test performed by PCR.    Rapid Strep A Screen - Swab, Throat [[id number]]  (Normal) Collected: 11/26/23 0754    Specimen: Swab from Throat Updated: 11/26/23 0813     Strep A Ag Negative    Beta Strep Culture, Throat - Swab, Throat [[id number]] Collected: 11/26/23 0754    Specimen: Swab from Throat Updated: 11/26/23 0812    CBC & Differential [[id number]]  (Abnormal) Collected: 11/26/23 0918    Specimen: Blood from Arm, Left Updated: 11/26/23 0930    Narrative:      The following orders were created for panel order CBC & Differential.  Procedure                               Abnormality         Status                     ---------                               -----------         ------                     CBC Auto Differential[[id number]]        Abnormal            Final result                 Please view results for these tests on the individual orders.    Comprehensive Metabolic Panel [[id number]]  (Abnormal) Collected: 11/26/23 0918    Specimen: Blood from Arm, Left Updated: 11/26/23 1005     Glucose 67 mg/dL      BUN 15 mg/dL      Creatinine 0.92 mg/dL      Sodium 135 mmol/L      Potassium 4.3 mmol/L      Comment: Slight hemolysis detected by analyzer. Result may be falsely elevated.        Chloride 101 mmol/L      CO2 15.4 mmol/L      Calcium 9.2 mg/dL      Total Protein 7.2 g/dL      Albumin 3.8 g/dL      ALT (SGPT) 12 U/L      AST (SGOT) 32 U/L      Alkaline Phosphatase 133 U/L      Total Bilirubin 0.5 mg/dL      Globulin 3.4 gm/dL      A/G Ratio 1.1 g/dL      BUN/Creatinine Ratio 16.3     Anion Gap 18.6 mmol/L      eGFR 89.9 mL/min/1.73     Narrative:      GFR Normal  ">60  Chronic Kidney Disease <60  Kidney Failure <15      Ferritin [[id number]]  (Normal) Collected: 11/26/23 0918    Specimen: Blood from Arm, Left Updated: 11/26/23 1005     Ferritin 139.00 ng/mL     Narrative:      Results may be falsely decreased if patient taking Biotin.      Lactate Dehydrogenase [[id number]]  (Abnormal) Collected: 11/26/23 0918    Specimen: Blood from Arm, Left Updated: 11/26/23 1005      U/L      Comment: Specimen hemolyzed.  Results may be affected.       Lactic Acid, Plasma [[id number]]  (Normal) Collected: 11/26/23 0918    Specimen: Blood from Arm, Left Updated: 11/26/23 0937     Lactate 1.8 mmol/L     Procalcitonin [[id number]]  (Normal) Collected: 11/26/23 0918    Specimen: Blood from Arm, Left Updated: 11/26/23 0952     Procalcitonin 0.18 ng/mL     Narrative:      As a Marker for Sepsis (Non-Neonates):    1. <0.5 ng/mL represents a low risk of severe sepsis and/or septic shock.  2. >2 ng/mL represents a high risk of severe sepsis and/or septic shock.    As a Marker for Lower Respiratory Tract Infections that require antibiotic therapy:    PCT on Admission    Antibiotic Therapy       6-12 Hrs later    >0.5                Strongly Recommended  >0.25 - <0.5        Recommended   0.1 - 0.25          Discouraged              Remeasure/reassess PCT  <0.1                Strongly Discouraged     Remeasure/reassess PCT    As 28 day mortality risk marker: \"Change in Procalcitonin Result\" (>80% or <=80%) if Day 0 (or Day 1) and Day 4 values are available. Refer to http://www.[facility]-pct-calculator.com    Change in PCT <=80%  A decrease of PCT levels below or equal to 80% defines a positive change in PCT test result representing a higher risk for 28-day all-cause mortality of patients diagnosed with severe sepsis for septic shock.    Change in PCT >80%  A decrease of PCT levels of more than 80% defines a negative change in PCT result representing a lower risk for 28-day all-cause mortality of " patients diagnosed with severe sepsis or septic shock.       CBC Auto Differential [[id number]]  (Abnormal) Collected: 11/26/23 0918    Specimen: Blood from Arm, Left Updated: 11/26/23 0930     WBC 8.41 10*3/mm3      RBC 4.77 10*6/mm3      Hemoglobin 14.2 g/dL      Hematocrit 44.4 %      MCV 93.1 fL      MCH 29.8 pg      MCHC 32.0 g/dL      RDW 11.1 %      RDW-SD 39.1 fl      MPV 12.9 fL      Platelets 170 10*3/mm3      Neutrophil % 60.3 %      Lymphocyte % 24.0 %      Monocyte % 14.1 %      Eosinophil % 1.2 %      Basophil % 0.2 %      Immature Grans % 0.2 %      Neutrophils, Absolute 5.06 10*3/mm3      Lymphocytes, Absolute 2.02 10*3/mm3      Monocytes, Absolute 1.19 10*3/mm3      Eosinophils, Absolute 0.10 10*3/mm3      Basophils, Absolute 0.02 10*3/mm3      Immature Grans, Absolute 0.02 10*3/mm3     Urinalysis With Microscopic If Indicated (No Culture) - Urine, Clean Catch [[id number]]  (Abnormal) Collected: 11/26/23 0918    Specimen: Urine, Clean Catch Updated: 11/26/23 0943     Color, UA Yellow     Appearance, UA Clear     pH, UA <=5.0     Specific Gravity, UA <=1.005     Glucose, UA Negative     Ketones, UA 15 mg/dL (1+)     Bilirubin, UA Negative     Blood, UA Trace     Protein, UA Negative     Leuk Esterase, UA Negative     Nitrite, UA Negative     Urobilinogen, UA 0.2 E.U./dL    Urinalysis, Microscopic Only - Urine, Clean Catch [[id number]]  (Abnormal) Collected: 11/26/23 0918    Specimen: Urine, Clean Catch Updated: 11/26/23 1006     RBC, UA 3-5 /HPF      WBC, UA None Seen /HPF      Bacteria, UA None Seen /HPF      Squamous Epithelial Cells, UA 0-2 /HPF      Hyaline Casts, UA None Seen /LPF      Methodology Manual Light Microscopy            Imaging Results (Last 24 Hours)       Procedure Component Value Units Date/Time    XR Chest 1 View [[id number]] Collected: 11/26/23 0859     Updated: 11/26/23 0901    Narrative:      XR CHEST 1 VW-, 11/26/2023 9:56 AM     HISTORY:  Fever and cough.     COMPARISON:  *   Chest 2/5/2017     FINDINGS:  Single frontal view(s) of the chest. No pleural effusions. No  pneumothorax. Heart size is normal. Mediastinal contours are within  normal limits. Pulmonary vasculature is normal. No acute osseous  abnormality.        Impression:      No acute cardiopulmonary findings.     This report was finalized on 11/26/2023 8:59 AM by Dr. Jimmy Hoffman MD.                   No orders to display        Assessment/Plan     Active Hospital Problems    Diagnosis  POA    **Sepsis due to COVID-19 [U07.1, A41.89]  Yes    Adrenal insufficiency [E27.40]  Yes    Diabetes insipidus [E23.2]  Yes    Hypothyroidism [E03.9]  Yes      Resolved Hospital Problems   No resolved problems to display.       Ms. Crawley is a 23 y.o. 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    Sepsis secondary to COVID-19: Patient presented with initial sepsis findings including temp > 100.9F and   with a diagnosis of confirmed  COVID-19 .  Blood cultures obtained, no indication for antibiotics given the antiviral nature of disease process.  L original and lactic acid of 1.8.  Start on Paxlovid, LR at 125 cc an hour given fever for any insensible losses and decreased oral intake.    High anion gap metabolic acidosis: Anion gap of 18, bicarb of 15 lactic within normal limits, will repeat labs now that patient received fluid resuscitation to ensure no worsening acidosis.  Adrenal insufficiency: At home patient takes 5 mg in the a.m. 5 mg p.m. and 2.5 mg at bedtime.  Per [facility] note on 02/28/2023 patient should take 15 mg every 8 hours for stress dosing.  Will start now.    Central diabetes insipidus continue home regimen of desmopressin    Hypothyroidism:  Continue home levothyroxine    I discussed the patient's findings and my recommendations with patient, family, and nursing staff.    VTE Prophylaxis -  not indicated Padua less than 4 .  Code Status - Full code.    Katherine Pearson MD  [facility]

## 2023-11-26 NOTE — ED NOTES
"Nursing report ED to floor  Viviane Crawley  23 y.o.  female    HPI :   Chief Complaint   Patient presents with    Fever    Cough    Fatigue    Sore Throat    Dizziness       Admitting doctor:   Katherine Pearson MD    Admitting diagnosis:   The primary encounter diagnosis was COVID-19 virus infection. A diagnosis of Adrenal insufficiency was also pertinent to this visit.    Code status:   Current Code Status       Date Active Code Status Order ID Comments User Context       Not on file            Allergies:   Patient has no known allergies.    Isolation:   Enhanced Droplet/Contact     Intake and Output  No intake or output data in the 24 hours ending 11/26/23 1044    Weight:       11/26/23  0803   Weight: 56.7 kg (125 lb)       Most recent vitals:   Vitals:    11/26/23 0803 11/26/23 0845 11/26/23 0934 11/26/23 1000   BP: 125/85  121/72 109/63   BP Location: Right arm      Patient Position: Lying      Pulse: (!) 130 (!) 121 112 91   Resp: 18  18    Temp: (!) 103.1 °F (39.5 °C)  99 °F (37.2 °C)    TempSrc: Oral  Oral    SpO2: 98% 99% 98% 97%   Weight: 56.7 kg (125 lb)      Height: 157.5 cm (62\")          Active LDAs/IV Access:   Lines, Drains & Airways       Active LDAs       Name Placement date Placement time Site Days    Peripheral IV 11/26/23 0932 Left Antecubital 11/26/23  0932  Antecubital  less than 1                    Labs (abnormal labs have a star):   Labs Reviewed   COVID-19,CEPHEID/GOLDIE,COR/ROEL/LAG/PAD/GORDON IN-HOUSE,NP SWAB IN TRANSPORT MEDIA 1 HR TAT, RT-PCR - Abnormal; Notable for the following components:       Result Value    COVID19 Detected (*)     All other components within normal limits    Narrative:     Fact sheet for providers: https://www.fda.gov/media/310255/download     Fact sheet for patients: https://www.fda.gov/media/957127/download  Fact sheet for providers: https://www.fda.gov/media/827724/download    Fact sheet for patients: https://www.fda.gov/media/392577/download    Test performed by PCR. " "  COMPREHENSIVE METABOLIC PANEL - Abnormal; Notable for the following components:    Sodium 135 (*)     CO2 15.4 (*)     Alkaline Phosphatase 133 (*)     Anion Gap 18.6 (*)     All other components within normal limits    Narrative:     GFR Normal >60  Chronic Kidney Disease <60  Kidney Failure <15     LACTATE DEHYDROGENASE - Abnormal; Notable for the following components:     (*)     All other components within normal limits   CBC WITH AUTO DIFFERENTIAL - Abnormal; Notable for the following components:    RDW 11.1 (*)     MPV 12.9 (*)     Monocyte % 14.1 (*)     Monocytes, Absolute 1.19 (*)     All other components within normal limits   URINALYSIS W/ MICROSCOPIC IF INDICATED (NO CULTURE) - Abnormal; Notable for the following components:    Ketones, UA 15 mg/dL (1+) (*)     Blood, UA Trace (*)     All other components within normal limits   URINALYSIS, MICROSCOPIC ONLY - Abnormal; Notable for the following components:    RBC, UA 3-5 (*)     All other components within normal limits   RAPID STREP A SCREEN - Normal   FERRITIN - Normal    Narrative:     Results may be falsely decreased if patient taking Biotin.     LACTIC ACID, PLASMA - Normal   PROCALCITONIN - Normal    Narrative:     As a Marker for Sepsis (Non-Neonates):    1. <0.5 ng/mL represents a low risk of severe sepsis and/or septic shock.  2. >2 ng/mL represents a high risk of severe sepsis and/or septic shock.    As a Marker for Lower Respiratory Tract Infections that require antibiotic therapy:    PCT on Admission    Antibiotic Therapy       6-12 Hrs later    >0.5                Strongly Recommended  >0.25 - <0.5        Recommended   0.1 - 0.25          Discouraged              Remeasure/reassess PCT  <0.1                Strongly Discouraged     Remeasure/reassess PCT    As 28 day mortality risk marker: \"Change in Procalcitonin Result\" (>80% or <=80%) if Day 0 (or Day 1) and Day 4 values are available. Refer to " http://www.[facility]-pct-calculator.com    Change in PCT <=80%  A decrease of PCT levels below or equal to 80% defines a positive change in PCT test result representing a higher risk for 28-day all-cause mortality of patients diagnosed with severe sepsis for septic shock.    Change in PCT >80%  A decrease of PCT levels of more than 80% defines a negative change in PCT result representing a lower risk for 28-day all-cause mortality of patients diagnosed with severe sepsis or septic shock.      BETA HEMOLYTIC STREP CULTURE, THROAT   CBC AND DIFFERENTIAL    Narrative:     The following orders were created for panel order CBC & Differential.  Procedure                               Abnormality         Status                     ---------                               -----------         ------                     CBC Auto Differential[[id number]]        Abnormal            Final result                 Please view results for these tests on the individual orders.       Results       Procedure Component Value Ref Range Date/Time    Urinalysis, Microscopic Only - Urine, Clean Catch [[id number]]  (Abnormal) Collected: 11/26/23 0918    Order Status: Completed Specimen: Urine, Clean Catch Updated: 11/26/23 1006     RBC, UA 3-5 None Seen, 0-2 /HPF      WBC, UA None Seen None Seen, 0-2 /HPF      Bacteria, UA None Seen None Seen /HPF      Squamous Epithelial Cells, UA 0-2 None Seen, 0-2 /HPF      Hyaline Casts, UA None Seen None Seen /LPF      Methodology Manual Light Microscopy    Ferritin [[id number]]  (Normal) Collected: 11/26/23 0918    Order Status: Completed Specimen: Blood from Arm, Left Updated: 11/26/23 1005     Ferritin 139.00 13.00 - 150.00 ng/mL     Narrative:      Results may be falsely decreased if patient taking Biotin.      Comprehensive Metabolic Panel [[id number]]  (Abnormal) Collected: 11/26/23 0918    Order Status: Completed Specimen: Blood from Arm, Left Updated: 11/26/23 1005     Glucose 67 65 - 99 mg/dL      BUN  "15 6 - 20 mg/dL      Creatinine 0.92 0.57 - 1.00 mg/dL      Sodium 135 136 - 145 mmol/L      Potassium 4.3 3.5 - 5.2 mmol/L      Comment: Slight hemolysis detected by analyzer. Result may be falsely elevated.        Chloride 101 98 - 107 mmol/L      CO2 15.4 22.0 - 29.0 mmol/L      Calcium 9.2 8.6 - 10.5 mg/dL      Total Protein 7.2 6.0 - 8.5 g/dL      Albumin 3.8 3.5 - 5.2 g/dL      ALT (SGPT) 12 1 - 33 U/L      AST (SGOT) 32 1 - 32 U/L      Alkaline Phosphatase 133 39 - 117 U/L      Total Bilirubin 0.5 0.0 - 1.2 mg/dL      Globulin 3.4 gm/dL      A/G Ratio 1.1 g/dL      BUN/Creatinine Ratio 16.3 7.0 - 25.0      Anion Gap 18.6 5.0 - 15.0 mmol/L      eGFR 89.9 >60.0 mL/min/1.73     Narrative:      GFR Normal >60  Chronic Kidney Disease <60  Kidney Failure <15      Lactate Dehydrogenase [[id number]]  (Abnormal) Collected: 11/26/23 0918    Order Status: Completed Specimen: Blood from Arm, Left Updated: 11/26/23 1005      135 - 214 U/L      Comment: Specimen hemolyzed.  Results may be affected.       Procalcitonin [[id number]]  (Normal) Collected: 11/26/23 0918    Order Status: Completed Specimen: Blood from Arm, Left Updated: 11/26/23 0952     Procalcitonin 0.18 0.00 - 0.25 ng/mL     Narrative:      As a Marker for Sepsis (Non-Neonates):    1. <0.5 ng/mL represents a low risk of severe sepsis and/or septic shock.  2. >2 ng/mL represents a high risk of severe sepsis and/or septic shock.    As a Marker for Lower Respiratory Tract Infections that require antibiotic therapy:    PCT on Admission    Antibiotic Therapy       6-12 Hrs later    >0.5                Strongly Recommended  >0.25 - <0.5        Recommended   0.1 - 0.25          Discouraged              Remeasure/reassess PCT  <0.1                Strongly Discouraged     Remeasure/reassess PCT    As 28 day mortality risk marker: \"Change in Procalcitonin Result\" (>80% or <=80%) if Day 0 (or Day 1) and Day 4 values are available. Refer to " http://www.[facility]-pct-calculator.com    Change in PCT <=80%  A decrease of PCT levels below or equal to 80% defines a positive change in PCT test result representing a higher risk for 28-day all-cause mortality of patients diagnosed with severe sepsis for septic shock.    Change in PCT >80%  A decrease of PCT levels of more than 80% defines a negative change in PCT result representing a lower risk for 28-day all-cause mortality of patients diagnosed with severe sepsis or septic shock.       Urinalysis With Microscopic If Indicated (No Culture) - Urine, Clean Catch [[id number]]  (Abnormal) Collected: 11/26/23 0918    Order Status: Completed Specimen: Urine, Clean Catch Updated: 11/26/23 0943     Color, UA Yellow Yellow, Straw      Appearance, UA Clear Clear      pH, UA <=5.0 4.5 - 8.0      Specific Gravity, UA <=1.005 1.003 - 1.030      Glucose, UA Negative Negative      Ketones, UA 15 mg/dL (1+) Negative      Bilirubin, UA Negative Negative      Blood, UA Trace Negative      Protein, UA Negative Negative      Leuk Esterase, UA Negative Negative      Nitrite, UA Negative Negative      Urobilinogen, UA 0.2 E.U./dL 0.2 - 1.0 E.U./dL     Lactic Acid, Plasma [[id number]]  (Normal) Collected: 11/26/23 0918    Order Status: Completed Specimen: Blood from Arm, Left Updated: 11/26/23 0937     Lactate 1.8 0.5 - 2.0 mmol/L     CBC Auto Differential [[id number]]  (Abnormal) Collected: 11/26/23 0918    Order Status: Completed Specimen: Blood from Arm, Left Updated: 11/26/23 0930     WBC 8.41 3.40 - 10.80 10*3/mm3      RBC 4.77 3.77 - 5.28 10*6/mm3      Hemoglobin 14.2 12.0 - 15.9 g/dL      Hematocrit 44.4 34.0 - 46.6 %      MCV 93.1 79.0 - 97.0 fL      MCH 29.8 26.6 - 33.0 pg      MCHC 32.0 31.5 - 35.7 g/dL      RDW 11.1 12.3 - 15.4 %      RDW-SD 39.1 37.0 - 54.0 fl      MPV 12.9 6.0 - 12.0 fL      Platelets 170 140 - 450 10*3/mm3      Neutrophil % 60.3 42.7 - 76.0 %      Lymphocyte % 24.0 19.6 - 45.3 %      Monocyte % 14.1 5.0 -  12.0 %      Eosinophil % 1.2 0.3 - 6.2 %      Basophil % 0.2 0.0 - 1.5 %      Immature Grans % 0.2 0.0 - 0.5 %      Neutrophils, Absolute 5.06 1.70 - 7.00 10*3/mm3      Lymphocytes, Absolute 2.02 0.70 - 3.10 10*3/mm3      Monocytes, Absolute 1.19 0.10 - 0.90 10*3/mm3      Eosinophils, Absolute 0.10 0.00 - 0.40 10*3/mm3      Basophils, Absolute 0.02 0.00 - 0.20 10*3/mm3      Immature Grans, Absolute 0.02 0.00 - 0.05 10*3/mm3     COVID-19,CEPHEID/GOLDIE,COR/ROEL/PAD/GORDON/LAG IN-HOUSE,NP SWAB IN TRANSPORT MEDIA 1 HR TAT, RT-PCR - Swab, Nasopharynx [[id number]]  (Abnormal) Collected: 11/26/23 0754    Order Status: Completed Specimen: Swab from Nasopharynx Updated: 11/26/23 0831     COVID19 Detected Not Detected - Ref. Range     Narrative:      Fact sheet for providers: https://www.fda.gov/media/870773/download     Fact sheet for patients: https://www.fda.gov/media/597343/download  Fact sheet for providers: https://www.fda.gov/media/875077/download    Fact sheet for patients: https://www.fda.gov/media/115229/download    Test performed by PCR.    Rapid Strep A Screen - Swab, Throat [[id number]]  (Normal) Collected: 11/26/23 0754    Order Status: Completed Specimen: Swab from Throat Updated: 11/26/23 0813     Strep A Ag Negative Negative     Beta Strep Culture, Throat - Swab, Throat [[id number]] Collected: 11/26/23 0754    Order Status: Sent Specimen: Swab from Throat Updated: 11/26/23 0812             EKG:   No orders to display       Meds given in ED:   Medications   sodium chloride 0.9 % flush 10 mL (has no administration in time range)   dextrose 5 % and sodium chloride 0.9 % infusion (125 mL/hr Intravenous New Bag 11/26/23 0979)   acetaminophen (TYLENOL) tablet 1,000 mg (1,000 mg Oral Given 11/26/23 1533)   Hydrocortisone Sod Suc (PF) (Solu-CORTEF) injection 250 mg (250 mg Intravenous Given 11/26/23 4776)   sodium chloride 0.9 % bolus 1,000 mL (1,000 mL Intravenous New Bag 11/26/23 9792)       Imaging results:  XR Chest 1  View    Result Date: 11/26/2023  No acute cardiopulmonary findings.  This report was finalized on 11/26/2023 8:59 AM by Dr. Jimmy Hoffman MD.       Ambulatory status:   - up without assistance    Social issues:   Social History     Socioeconomic History    Marital status: Single   Tobacco Use    Smoking status: Never    Smokeless tobacco: Never   Vaping Use    Vaping Use: Never used   Substance and Sexual Activity    Alcohol use: Never    Drug use: Never    Sexual activity: Not Currently       NIH Stroke Scale:         Clara Samano RN  11/26/23 10:44 EST

## 2023-11-26 NOTE — NURSING NOTE
Spoke to patient's mother at home - went over patient's home med list. Pt is in need of her desmopressin ASAP and requested mom bring in pt's supply ASAP.

## 2023-11-27 VITALS
HEIGHT: 62 IN | OXYGEN SATURATION: 97 % | SYSTOLIC BLOOD PRESSURE: 122 MMHG | BODY MASS INDEX: 20.54 KG/M2 | RESPIRATION RATE: 18 BRPM | DIASTOLIC BLOOD PRESSURE: 67 MMHG | TEMPERATURE: 97.7 F | WEIGHT: 111.6 LBS | HEART RATE: 87 BPM

## 2023-11-27 PROBLEM — D89.831 CYTOKINE RELEASE SYNDROME, GRADE 1: Status: ACTIVE | Noted: 2023-11-27

## 2023-11-27 LAB
ALBUMIN SERPL-MCNC: 3 G/DL (ref 3.5–5.2)
ALBUMIN/GLOB SERPL: 1.3 G/DL
ALP SERPL-CCNC: 88 U/L (ref 39–117)
ALT SERPL W P-5'-P-CCNC: 9 U/L (ref 1–33)
ANION GAP SERPL CALCULATED.3IONS-SCNC: 9.2 MMOL/L (ref 5–15)
AST SERPL-CCNC: 20 U/L (ref 1–32)
BASOPHILS # BLD AUTO: 0.02 10*3/MM3 (ref 0–0.2)
BASOPHILS NFR BLD AUTO: 0.3 % (ref 0–1.5)
BILIRUB SERPL-MCNC: 0.2 MG/DL (ref 0–1.2)
BUN SERPL-MCNC: 6 MG/DL (ref 6–20)
BUN/CREAT SERPL: 9 (ref 7–25)
CALCIUM SPEC-SCNC: 8.3 MG/DL (ref 8.6–10.5)
CHLORIDE SERPL-SCNC: 111 MMOL/L (ref 98–107)
CO2 SERPL-SCNC: 21.8 MMOL/L (ref 22–29)
CREAT SERPL-MCNC: 0.67 MG/DL (ref 0.57–1)
CRP SERPL-MCNC: 1.49 MG/DL (ref 0–0.5)
DEPRECATED RDW RBC AUTO: 37.7 FL (ref 37–54)
EGFRCR SERPLBLD CKD-EPI 2021: 126.1 ML/MIN/1.73
EOSINOPHIL # BLD AUTO: 0.06 10*3/MM3 (ref 0–0.4)
EOSINOPHIL NFR BLD AUTO: 0.8 % (ref 0.3–6.2)
ERYTHROCYTE [DISTWIDTH] IN BLOOD BY AUTOMATED COUNT: 11.1 % (ref 12.3–15.4)
GLOBULIN UR ELPH-MCNC: 2.3 GM/DL
GLUCOSE BLDC GLUCOMTR-MCNC: 104 MG/DL (ref 70–130)
GLUCOSE BLDC GLUCOMTR-MCNC: 74 MG/DL (ref 70–130)
GLUCOSE SERPL-MCNC: 81 MG/DL (ref 65–99)
HCT VFR BLD AUTO: 31.7 % (ref 34–46.6)
HGB BLD-MCNC: 10.4 G/DL (ref 12–15.9)
IMM GRANULOCYTES # BLD AUTO: 0 10*3/MM3 (ref 0–0.05)
IMM GRANULOCYTES NFR BLD AUTO: 0 % (ref 0–0.5)
LYMPHOCYTES # BLD AUTO: 2.02 10*3/MM3 (ref 0.7–3.1)
LYMPHOCYTES NFR BLD AUTO: 25.5 % (ref 19.6–45.3)
MAGNESIUM SERPL-MCNC: 1.6 MG/DL (ref 1.6–2.6)
MCH RBC QN AUTO: 29.6 PG (ref 26.6–33)
MCHC RBC AUTO-ENTMCNC: 32.8 G/DL (ref 31.5–35.7)
MCV RBC AUTO: 90.3 FL (ref 79–97)
MONOCYTES # BLD AUTO: 0.51 10*3/MM3 (ref 0.1–0.9)
MONOCYTES NFR BLD AUTO: 6.4 % (ref 5–12)
NEUTROPHILS NFR BLD AUTO: 5.31 10*3/MM3 (ref 1.7–7)
NEUTROPHILS NFR BLD AUTO: 67 % (ref 42.7–76)
PLATELET # BLD AUTO: 147 10*3/MM3 (ref 140–450)
PMV BLD AUTO: 12.5 FL (ref 6–12)
POTASSIUM SERPL-SCNC: 3.2 MMOL/L (ref 3.5–5.2)
PROCALCITONIN SERPL-MCNC: 0.11 NG/ML (ref 0–0.25)
PROT SERPL-MCNC: 5.3 G/DL (ref 6–8.5)
RBC # BLD AUTO: 3.51 10*6/MM3 (ref 3.77–5.28)
SODIUM SERPL-SCNC: 142 MMOL/L (ref 136–145)
WBC NRBC COR # BLD AUTO: 7.92 10*3/MM3 (ref 3.4–10.8)

## 2023-11-27 PROCEDURE — 83735 ASSAY OF MAGNESIUM: CPT | Performed by: HOSPITALIST

## 2023-11-27 PROCEDURE — 96361 HYDRATE IV INFUSION ADD-ON: CPT

## 2023-11-27 PROCEDURE — 84145 PROCALCITONIN (PCT): CPT | Performed by: HOSPITALIST

## 2023-11-27 PROCEDURE — 80053 COMPREHEN METABOLIC PANEL: CPT | Performed by: STUDENT IN AN ORGANIZED HEALTH CARE EDUCATION/TRAINING PROGRAM

## 2023-11-27 PROCEDURE — 82948 REAGENT STRIP/BLOOD GLUCOSE: CPT

## 2023-11-27 PROCEDURE — 86140 C-REACTIVE PROTEIN: CPT | Performed by: STUDENT IN AN ORGANIZED HEALTH CARE EDUCATION/TRAINING PROGRAM

## 2023-11-27 PROCEDURE — G0378 HOSPITAL OBSERVATION PER HR: HCPCS

## 2023-11-27 PROCEDURE — 85025 COMPLETE CBC W/AUTO DIFF WBC: CPT | Performed by: STUDENT IN AN ORGANIZED HEALTH CARE EDUCATION/TRAINING PROGRAM

## 2023-11-27 PROCEDURE — 25810000003 LACTATED RINGERS PER 1000 ML: Performed by: STUDENT IN AN ORGANIZED HEALTH CARE EDUCATION/TRAINING PROGRAM

## 2023-11-27 RX ORDER — NORGESTIMATE AND ETHINYL ESTRADIOL 0.25-0.035
1 KIT ORAL DAILY
Status: DISCONTINUED | OUTPATIENT
Start: 2023-11-27 | End: 2023-11-27 | Stop reason: SDUPTHER

## 2023-11-27 RX ORDER — ECHINACEA PURPUREA EXTRACT 125 MG
2 TABLET ORAL AS NEEDED
Status: DISCONTINUED | OUTPATIENT
Start: 2023-11-27 | End: 2023-11-27 | Stop reason: HOSPADM

## 2023-11-27 RX ORDER — NORGESTIMATE AND ETHINYL ESTRADIOL 0.25-0.035
1 KIT ORAL DAILY
Status: DISCONTINUED | OUTPATIENT
Start: 2023-11-27 | End: 2023-11-27 | Stop reason: HOSPADM

## 2023-11-27 RX ADMIN — NORGESTIMATE AND ETHINYL ESTRADIOL 1 TABLET: KIT at 08:52

## 2023-11-27 RX ADMIN — SALINE NASAL SPRAY 2 SPRAY: 1.5 SOLUTION NASAL at 05:08

## 2023-11-27 RX ADMIN — SODIUM CHLORIDE, POTASSIUM CHLORIDE, SODIUM LACTATE AND CALCIUM CHLORIDE 125 ML/HR: 600; 310; 30; 20 INJECTION, SOLUTION INTRAVENOUS at 05:08

## 2023-11-27 RX ADMIN — HYDROCORTISONE 15 MG: 10 TABLET ORAL at 08:49

## 2023-11-27 RX ADMIN — LEVOTHYROXINE SODIUM 88 MCG: 0.09 TABLET ORAL at 05:08

## 2023-11-27 RX ADMIN — SODIUM CHLORIDE, POTASSIUM CHLORIDE, SODIUM LACTATE AND CALCIUM CHLORIDE 125 ML/HR: 600; 310; 30; 20 INJECTION, SOLUTION INTRAVENOUS at 13:07

## 2023-11-27 RX ADMIN — DESMOPRESSIN ACETATE 100 MCG: 0.1 TABLET ORAL at 09:13

## 2023-11-27 RX ADMIN — DESMOPRESSIN ACETATE 50 MCG: 0.1 TABLET ORAL at 14:09

## 2023-11-27 RX ADMIN — HYDROCORTISONE 15 MG: 10 TABLET ORAL at 14:09

## 2023-11-27 RX ADMIN — NIRMATRELVIR AND RITONAVIR 3 TABLET: KIT at 08:52

## 2023-11-27 NOTE — CASE MANAGEMENT/SOCIAL WORK
Continued Stay Note  JOSE Nazario     Patient Name: Viviane Crawley  MRN: [id number]  Today's Date: 11/27/2023    Admit Date: 11/26/2023    Plan: plan home with mother   Discharge Plan       Row Name 11/27/23 2030       Plan    Plan plan home with mother    Patient/Family in Agreement with Plan yes    Plan Comments Wearing appropriate PPE, spoke with patient at bedside. Permission to speak with mothr present. Face sheet verified. Patient lives with her mother in a home and is independent of ADLs she denies DME/HH/Rehab. She does not have a living will and is not interested in creating one.. She sees Dr Salcido as PCP. SHe uses [facility] [place] and there ar no issues obtaining medications. Plan for patient to dc home this afternoon with her mother to assist as needed.  CM will follow through dc.                   Discharge Codes    No documentation.                 Expected Discharge Date and Time       Expected Discharge Date Expected Discharge Time    Nov 27, 2023               Jesu Francis RN

## 2023-11-27 NOTE — DISCHARGE SUMMARY
Viviane Crawley  2000  [id number]    Hospitalists Discharge Summary    Date of Admission: 11/26/2023  Date of Discharge:  11/27/2023    Primary Discharge Diagnoses:  Acute COVID-19 Infection    Secondary Discharge Diagnoses:  Adrenal Insufficiency  Hypothyroidism  Central Diabetes Insipidus    History of Present Illness (taken from H&P):  Patient is unable to provide a complete history but stated that a few days ago she started to feel a sore throat and it was hurting to swallow as well as some diarrhea.  Nothing was improving her symptoms and she was brought into the emergency department with her mother.     I spoke to mother on the telephone who said that patient was not feeling well did notice she was having some tiredness as well as a very difficult time swallowing given the severity of her sore throat.  Noted that patient drink up to a gallon of water yesterday and was not sure if this was because of COVID or because of the sore throat.  Mother has been providing patient with double her dose as a sick day rule although difficult to take because patient was having difficulty swallowing her pills.     Of note mother, Magalie, is primary caretaker for patient and administers all of her meds given some ongoing delay post surgical procedures.    Hospital Course:  Ms. Crawley was admitted to the Med/Surg unit.  She was continued on Paxlovid and IVF.  She remained HD stable w/ no evidence of adrenal crisis.  Sepsis was ruled out because Ms. Crawley was not septic to begin with as no SEP-3 criteria were met.  She was taking in PO better and tolerating her oral medical therapy at discharge.    PCP  Patient Care Team:  Franklin Salcido MD as PCP - General (Family Medicine)    Consults:   Consults       No orders found for last 30 day(s).            Operations and Procedures Performed:       XR Chest 1 View    Result Date: 11/26/2023  Narrative: XR CHEST 1 VW-, 11/26/2023 9:56 AM  HISTORY: Fever and cough.  COMPARISON: *  Chest  2/5/2017  FINDINGS: Single frontal view(s) of the chest. No pleural effusions. No pneumothorax. Heart size is normal. Mediastinal contours are within normal limits. Pulmonary vasculature is normal. No acute osseous abnormality.      Impression: No acute cardiopulmonary findings.  This report was finalized on 11/26/2023 8:59 AM by Dr. Jimmy Hoffman MD.       Allergies:  has No Known Allergies.    Miguel Angel  reviewed    Discharge Medications:     Discharge Medications        New Medications        Instructions Start Date   Nirmatrelvir&Ritonavir 300/100 20 x 150 MG & 10 x 100MG tablet therapy pack tablet  Commonly known as: PAXLOVID   3 tablets, Oral, 2 Times Daily             Continue These Medications        Instructions Start Date   desmopressin 0.1 MG tablet  Commonly known as: DDAVP   0.1 mg, Oral, Daily, 0.1 mg in the AM, 0.05 mg in the afternoon and at bedtime      Estarylla 0.25-35 MG-MCG per tablet  Generic drug: norgestimate-ethinyl estradiol   1 tablet, Oral, Daily      hydrocortisone 10 MG tablet  Commonly known as: CORTEF   5 mg, Oral, Daily, 1 tab in AM, 1 tab in the afternoon and 1/2 tab at night (8.6mg/m2)      levothyroxine 88 MCG tablet  Commonly known as: SYNTHROID, LEVOTHROID   88 mcg, Oral, Daily      NON FORMULARY   Vitamin D  - dose unknown               Last Lab Results:   Lab Results (most recent)       Procedure Component Value Units Date/Time    Procalcitonin [[id number]] Collected: 11/27/23 1035    Specimen: Blood Updated: 11/27/23 1321    Magnesium [[id number]] Collected: 11/27/23 1035    Specimen: Blood Updated: 11/27/23 1321    POC Glucose Once [[id number]]  (Normal) Collected: 11/27/23 1236    Specimen: Blood Updated: 11/27/23 1243     Glucose 104 mg/dL     Comprehensive Metabolic Panel [[id number]]  (Abnormal) Collected: 11/27/23 1035    Specimen: Blood Updated: 11/27/23 1107     Glucose 81 mg/dL      BUN 6 mg/dL      Creatinine 0.67 mg/dL      Sodium 142 mmol/L      Potassium 3.2 mmol/L       Chloride 111 mmol/L      CO2 21.8 mmol/L      Calcium 8.3 mg/dL      Total Protein 5.3 g/dL      Albumin 3.0 g/dL      ALT (SGPT) 9 U/L      AST (SGOT) 20 U/L      Alkaline Phosphatase 88 U/L      Total Bilirubin 0.2 mg/dL      Globulin 2.3 gm/dL      A/G Ratio 1.3 g/dL      BUN/Creatinine Ratio 9.0     Anion Gap 9.2 mmol/L      eGFR 126.1 mL/min/1.73     Narrative:      GFR Normal >60  Chronic Kidney Disease <60  Kidney Failure <15      CBC & Differential [[id number]]  (Abnormal) Collected: 11/27/23 1035    Specimen: Blood Updated: 11/27/23 1044    Narrative:      The following orders were created for panel order CBC & Differential.  Procedure                               Abnormality         Status                     ---------                               -----------         ------                     CBC Auto Differential[[id number]]        Abnormal            Final result                 Please view results for these tests on the individual orders.    CBC Auto Differential [[id number]]  (Abnormal) Collected: 11/27/23 1035    Specimen: Blood Updated: 11/27/23 1044     WBC 7.92 10*3/mm3      RBC 3.51 10*6/mm3      Hemoglobin 10.4 g/dL      Hematocrit 31.7 %      MCV 90.3 fL      MCH 29.6 pg      MCHC 32.8 g/dL      RDW 11.1 %      RDW-SD 37.7 fl      MPV 12.5 fL      Platelets 147 10*3/mm3      Neutrophil % 67.0 %      Lymphocyte % 25.5 %      Monocyte % 6.4 %      Eosinophil % 0.8 %      Basophil % 0.3 %      Immature Grans % 0.0 %      Neutrophils, Absolute 5.31 10*3/mm3      Lymphocytes, Absolute 2.02 10*3/mm3      Monocytes, Absolute 0.51 10*3/mm3      Eosinophils, Absolute 0.06 10*3/mm3      Basophils, Absolute 0.02 10*3/mm3      Immature Grans, Absolute 0.00 10*3/mm3     C-reactive Protein [[id number]] Collected: 11/27/23 1035    Specimen: Blood Updated: 11/27/23 1035    POC Glucose Once [[id number]]  (Normal) Collected: 11/27/23 0742    Specimen: Blood Updated: 11/27/23 0747     Glucose 74 mg/dL     Basic  Metabolic Panel [[id number]]  (Abnormal) Collected: 11/26/23 1548    Specimen: Blood Updated: 11/26/23 1618     Glucose 237 mg/dL      BUN 11 mg/dL      Creatinine 0.85 mg/dL      Sodium 143 mmol/L      Potassium 4.0 mmol/L      Chloride 117 mmol/L      CO2 16.6 mmol/L      Calcium 8.4 mg/dL      BUN/Creatinine Ratio 12.9     Anion Gap 9.4 mmol/L      eGFR 98.9 mL/min/1.73     Narrative:      GFR Normal >60  Chronic Kidney Disease <60  Kidney Failure <15      Blood Culture - Blood, Arm, Right [[id number]] Collected: 11/26/23 1548    Specimen: Blood from Arm, Right Updated: 11/26/23 1549    Blood Culture - Blood, Hand, Right [[id number]] Collected: 11/26/23 1548    Specimen: Blood from Hand, Right Updated: 11/26/23 1549    Urinalysis, Microscopic Only - Urine, Clean Catch [[id number]]  (Abnormal) Collected: 11/26/23 0918    Specimen: Urine, Clean Catch Updated: 11/26/23 1006     RBC, UA 3-5 /HPF      WBC, UA None Seen /HPF      Bacteria, UA None Seen /HPF      Squamous Epithelial Cells, UA 0-2 /HPF      Hyaline Casts, UA None Seen /LPF      Methodology Manual Light Microscopy    Ferritin [[id number]]  (Normal) Collected: 11/26/23 0918    Specimen: Blood from Arm, Left Updated: 11/26/23 1005     Ferritin 139.00 ng/mL     Narrative:      Results may be falsely decreased if patient taking Biotin.      Comprehensive Metabolic Panel [[id number]]  (Abnormal) Collected: 11/26/23 0918    Specimen: Blood from Arm, Left Updated: 11/26/23 1005     Glucose 67 mg/dL      BUN 15 mg/dL      Creatinine 0.92 mg/dL      Sodium 135 mmol/L      Potassium 4.3 mmol/L      Comment: Slight hemolysis detected by analyzer. Result may be falsely elevated.        Chloride 101 mmol/L      CO2 15.4 mmol/L      Calcium 9.2 mg/dL      Total Protein 7.2 g/dL      Albumin 3.8 g/dL      ALT (SGPT) 12 U/L      AST (SGOT) 32 U/L      Alkaline Phosphatase 133 U/L      Total Bilirubin 0.5 mg/dL      Globulin 3.4 gm/dL      A/G Ratio 1.1 g/dL      BUN/Creatinine  "Ratio 16.3     Anion Gap 18.6 mmol/L      eGFR 89.9 mL/min/1.73     Narrative:      GFR Normal >60  Chronic Kidney Disease <60  Kidney Failure <15      Lactate Dehydrogenase [[id number]]  (Abnormal) Collected: 11/26/23 0918    Specimen: Blood from Arm, Left Updated: 11/26/23 1005      U/L      Comment: Specimen hemolyzed.  Results may be affected.       Procalcitonin [[id number]]  (Normal) Collected: 11/26/23 0918    Specimen: Blood from Arm, Left Updated: 11/26/23 0952     Procalcitonin 0.18 ng/mL     Narrative:      As a Marker for Sepsis (Non-Neonates):    1. <0.5 ng/mL represents a low risk of severe sepsis and/or septic shock.  2. >2 ng/mL represents a high risk of severe sepsis and/or septic shock.    As a Marker for Lower Respiratory Tract Infections that require antibiotic therapy:    PCT on Admission    Antibiotic Therapy       6-12 Hrs later    >0.5                Strongly Recommended  >0.25 - <0.5        Recommended   0.1 - 0.25          Discouraged              Remeasure/reassess PCT  <0.1                Strongly Discouraged     Remeasure/reassess PCT    As 28 day mortality risk marker: \"Change in Procalcitonin Result\" (>80% or <=80%) if Day 0 (or Day 1) and Day 4 values are available. Refer to http://www.[facility]-pct-calculator.com    Change in PCT <=80%  A decrease of PCT levels below or equal to 80% defines a positive change in PCT test result representing a higher risk for 28-day all-cause mortality of patients diagnosed with severe sepsis for septic shock.    Change in PCT >80%  A decrease of PCT levels of more than 80% defines a negative change in PCT result representing a lower risk for 28-day all-cause mortality of patients diagnosed with severe sepsis or septic shock.       Urinalysis With Microscopic If Indicated (No Culture) - Urine, Clean Catch [[id number]]  (Abnormal) Collected: 11/26/23 0918    Specimen: Urine, Clean Catch Updated: 11/26/23 0943     Color, UA Yellow     Appearance, UA " Clear     pH, UA <=5.0     Specific Gravity, UA <=1.005     Glucose, UA Negative     Ketones, UA 15 mg/dL (1+)     Bilirubin, UA Negative     Blood, UA Trace     Protein, UA Negative     Leuk Esterase, UA Negative     Nitrite, UA Negative     Urobilinogen, UA 0.2 E.U./dL    Lactic Acid, Plasma [[id number]]  (Normal) Collected: 11/26/23 0918    Specimen: Blood from Arm, Left Updated: 11/26/23 0937     Lactate 1.8 mmol/L     CBC & Differential [[id number]]  (Abnormal) Collected: 11/26/23 0918    Specimen: Blood from Arm, Left Updated: 11/26/23 0930    Narrative:      The following orders were created for panel order CBC & Differential.  Procedure                               Abnormality         Status                     ---------                               -----------         ------                     CBC Auto Differential[[id number]]        Abnormal            Final result                 Please view results for these tests on the individual orders.    CBC Auto Differential [[id number]]  (Abnormal) Collected: 11/26/23 0918    Specimen: Blood from Arm, Left Updated: 11/26/23 0930     WBC 8.41 10*3/mm3      RBC 4.77 10*6/mm3      Hemoglobin 14.2 g/dL      Hematocrit 44.4 %      MCV 93.1 fL      MCH 29.8 pg      MCHC 32.0 g/dL      RDW 11.1 %      RDW-SD 39.1 fl      MPV 12.9 fL      Platelets 170 10*3/mm3      Neutrophil % 60.3 %      Lymphocyte % 24.0 %      Monocyte % 14.1 %      Eosinophil % 1.2 %      Basophil % 0.2 %      Immature Grans % 0.2 %      Neutrophils, Absolute 5.06 10*3/mm3      Lymphocytes, Absolute 2.02 10*3/mm3      Monocytes, Absolute 1.19 10*3/mm3      Eosinophils, Absolute 0.10 10*3/mm3      Basophils, Absolute 0.02 10*3/mm3      Immature Grans, Absolute 0.02 10*3/mm3     COVID-19,CEPHEID/GOLDIE,COR/ROEL/PAD/GORDON/LAG IN-HOUSE,NP SWAB IN TRANSPORT MEDIA 1 HR TAT, RT-PCR - Swab, Nasopharynx [[id number]]  (Abnormal) Collected: 11/26/23 0754    Specimen: Swab from Nasopharynx Updated: 11/26/23 0831      COVID19 Detected    Narrative:      Fact sheet for providers: https://www.fda.gov/media/251888/download     Fact sheet for patients: https://www.fda.gov/media/011410/download  Fact sheet for providers: https://www.fda.gov/media/334860/download    Fact sheet for patients: https://www.fda.gov/media/189223/download    Test performed by PCR.    Rapid Strep A Screen - Swab, Throat [[id number]]  (Normal) Collected: 11/26/23 0754    Specimen: Swab from Throat Updated: 11/26/23 0813     Strep A Ag Negative    Beta Strep Culture, Throat - Swab, Throat [[id number]] Collected: 11/26/23 0754    Specimen: Swab from Throat Updated: 11/26/23 0812          Imaging Results (Most Recent)       Procedure Component Value Units Date/Time    XR Chest 1 View [[id number]] Collected: 11/26/23 0859     Updated: 11/26/23 0901    Narrative:      XR CHEST 1 VW-, 11/26/2023 9:56 AM     HISTORY:  Fever and cough.     COMPARISON:  *  Chest 2/5/2017     FINDINGS:  Single frontal view(s) of the chest. No pleural effusions. No  pneumothorax. Heart size is normal. Mediastinal contours are within  normal limits. Pulmonary vasculature is normal. No acute osseous  abnormality.        Impression:      No acute cardiopulmonary findings.     This report was finalized on 11/26/2023 8:59 AM by Dr. Jimmy Hoffman MD.               PROCEDURES      Condition on Discharge:  Stable    Physical Exam at Discharge  Vital Signs  Temp:  [97.6 °F (36.4 °C)-97.9 °F (36.6 °C)] 97.7 °F (36.5 °C)  Heart Rate:  [83-91] 87  Resp:  [16-18] 18  BP: (111-122)/(63-72) 122/67    Physical Exam:  Physical Exam   Constitutional: Patient appears well-developed and well-nourished and in no acute distress   Cardiovascular: Regular rate, regular rhythm, S1 normal and S2 normal.  Exam reveals no gallop and no friction rub.  No murmur heard.  Radial pulses are 2+ and symmetric.  Pulmonary/Chest: Lungs are clear to auscultation bilaterally. No respiratory distress. No wheezes. No rhonchi. No  rales.   Abdominal: Soft. Bowel sounds are normal. There is no tenderness.   Musculoskeletal: Normal Muscle tone  Extremities: No edema. Neurological: Patient is alert and oriented to person, place, and time. Cranial nerves II-XII are grossly intact with no focal deficits.    Discharge Disposition  Home    Visiting Nurse:    No     Home PT/OT:  No     Home Safety Evaluation:  No     DME  None    Discharge Diet:      Dietary Orders (From admission, onward)       Start     Ordered    11/26/23 1145  Diet: Regular/House Diet; Texture: Regular Texture (IDDSI 7); Fluid Consistency: Thin (IDDSI 0)  Diet Effective Now        References:    Diet Order [place]   Question Answer Comment   Diets: Regular/House Diet    Texture: Regular Texture (IDDSI 7)    Fluid Consistency: Thin (IDDSI 0)        11/26/23 1144                    Activity at Discharge:  As tolerated      Follow-up Appointments  No future appointments.  Additional Instructions for the Follow-ups that You Need to Schedule       Discharge Follow-up with PCP   As directed       Currently Documented PCP:    Franklin Salcido MD    PCP Phone Number:    [phone]     Follow Up Details: 1 week                Test Results Pending at Discharge  None       Prabhu Gordillo MD  11/27/23  15:00 EST    Time: <30 minutes

## 2023-11-27 NOTE — PLAN OF CARE
Goal Outcome Evaluation:    Patient arrived to unit from ER, COVID +, hanging LR and giving patient food and water. Encouraged plenty of fluid intake. No complaints at this time.     Tried to call mom 2 times on number in chart and phone is not in working order. MD told me patient was on phone with mom  so I ran into room to tell mom via phone that she can come stay with patient due to patient's mental capabilities.    Unsure at this time exactly what patient can and cannot do, physically and mentally. However, this can be confirmed when mom arrives.                     
Goal Outcome Evaluation:  Plan of Care Reviewed With: patient, mother        Progress: improving  Outcome Evaluation: Pt VSS, am labs pending. Pt continues tele, SR, HR 80's-100's, continues room air overnight. IVF, paxlovid. Pt reports sore throat improved with current prn regimen, see emar, flowsheets. Pt denies n/v/d overnight, reports tolerating diet. Pt up with standby assist to restroom, mother at bedside. Pt resting at this time.         
Goal Outcome Evaluation:  Plan of Care Reviewed With: patient, mother        Progress: improving  Outcome Evaluation: Pt admitted to M/S from ED. Up with SBA. Makes needs known. Pt afebrile at this time. Mother brought in home medications of desmopressin and birth control pill - in cupboard in pt room - needs to be taken to pharmacy in morning for reconciliation. Sinus rhythm on monitor. O2 saturations to % on room air. Voices her throat is sore - declined tylenol or ibuprofen.         
By discharge

## 2023-11-27 NOTE — DISCHARGE INSTR - APPOINTMENTS
Patient needs to call and make a hospital follow up with Dr. Salcido within 1-2 weeks of discharge, [phone]

## 2023-11-28 ENCOUNTER — READMISSION MANAGEMENT (OUTPATIENT)
Dept: CALL CENTER | Facility: HOSPITAL | Age: 23
End: 2023-11-28

## 2023-11-28 NOTE — OUTREACH NOTE
Prep Survey      Flowsheet Row Responses   Mu-ism facility patient discharged from? [facility]   Is LACE score < 7 ? Yes   Eligibility Readm Mgmt   Discharge diagnosis Sepsis secondary to COVID-19   Does the patient have one of the following disease processes/diagnoses(primary or secondary)? Other   Does the patient have Home health ordered? No   Is there a DME ordered? No   Prep survey completed? Yes            Lisa PERRY - Registered Nurse

## 2023-11-28 NOTE — CASE MANAGEMENT/SOCIAL WORK
Case Management Discharge Note      Final Note: Discharged home.         Selected Continued Care - Discharged on 11/27/2023 Admission date: 11/26/2023 - Discharge disposition: Home or Self Care      Destination    No services have been selected for the patient.                Durable Medical Equipment    No services have been selected for the patient.                Dialysis/Infusion    No services have been selected for the patient.                Home Medical Care    No services have been selected for the patient.                Therapy    No services have been selected for the patient.                Community Resources    No services have been selected for the patient.                Community & DME    No services have been selected for the patient.                         Final Discharge Disposition Code: 01 - home or self-care

## 2023-11-29 LAB — BACTERIA SPEC AEROBE CULT: NORMAL

## 2023-11-30 ENCOUNTER — READMISSION MANAGEMENT (OUTPATIENT)
Dept: CALL CENTER | Facility: HOSPITAL | Age: 23
End: 2023-11-30

## 2023-11-30 NOTE — OUTREACH NOTE
LAG < 7 Survey      Flowsheet Row Responses   Latter-day facility patient discharged from? [facility]   Does the patient have one of the following disease processes/diagnoses(primary or secondary)? Other   BHLAG <7 Attempt successful? No   Unsuccessful attempts Attempt 1            Nallely PERRY - Registered Nurse

## 2023-12-01 LAB
BACTERIA SPEC AEROBE CULT: NORMAL
BACTERIA SPEC AEROBE CULT: NORMAL

## 2023-12-05 ENCOUNTER — READMISSION MANAGEMENT (OUTPATIENT)
Dept: CALL CENTER | Facility: HOSPITAL | Age: 23
End: 2023-12-05

## 2023-12-05 NOTE — OUTREACH NOTE
LAG < 7 Survey      Flowsheet Row Responses   Orthodox facility patient discharged from? [facility]   Does the patient have one of the following disease processes/diagnoses(primary or secondary)? Other   BHLAG <7 Attempt successful? No   Unsuccessful attempts Attempt 2            Ashlyn IRVING - Registered Nurse

## 2023-12-07 ENCOUNTER — READMISSION MANAGEMENT (OUTPATIENT)
Dept: CALL CENTER | Facility: HOSPITAL | Age: 23
End: 2023-12-07

## 2023-12-07 NOTE — OUTREACH NOTE
LAG < 7 Survey      Flowsheet Row Responses   Confucianist facility patient discharged from? [facility]   Does the patient have one of the following disease processes/diagnoses(primary or secondary)? Other   BHLAG <7 Attempt successful? No   Unsuccessful attempts Attempt 3  [Both numbers listed were attempted-no answer]            Isa H - Registered Nurse

## 2025-01-29 NOTE — ED PROVIDER NOTES
Subjective     History provided by:  Patient  History of Present Illness    Chief complaint: Sore throat    Location: Back of the throat and tongue.    Quality/Severity: Patient reports soreness of the throat.  She reports white patches on her tongue.    Timing/Onset: Started 2 to 3 weeks ago.    Modifying Factors: Swallowing exacerbates the pain.    Associated symptoms: Denies a fever, cough, runny nose, neck pain.  Denies shortness of breath.  Denies a rash.    Narrative: The patient is a 23-year-old -American female complaining of a 2 to 3-week history of a sore throat.  She reports red bumps and white patches on her tongue.  She denies a fever.  Denies a history of similar symptoms.  She states her past medical history is negative and she takes no medications.  Her last menstrual period was a few months ago and she is on oral birth control.  She works at [employer].  H  /77   Pulse 91   Temp 98.9 °F (37.2 °C) (Oral)   Resp 16   SpO2 93%   Review of Systems    Past Medical History:   Diagnosis Date    Adrenal insufficiency     Brain cancer     dx at age 8    Dwarfism, pituitary     Growth disorder     Hypertension     Hypothyroid        No Known Allergies    Past Surgical History:   Procedure Laterality Date    SHUNT REMOVAL         History reviewed. No pertinent family history.    Social History     Socioeconomic History    Marital status: Single   Tobacco Use    Smoking status: Never    Smokeless tobacco: Never   Vaping Use    Vaping Use: Never used   Substance and Sexual Activity    Alcohol use: Never    Drug use: Never    Sexual activity: Not Currently           Objective   Physical Exam  Vitals and nursing note reviewed.   Constitutional:       General: She is not in acute distress.     Appearance: She is well-developed and normal weight. She is not ill-appearing, toxic-appearing or diaphoretic.      Comments: Patient appears healthy in no acute distress.  Review of her vital signs: She is  afebrile and all her vital signs are within normal limits.   HENT:      Head: Normocephalic and atraumatic.      Mouth/Throat:      Mouth: Mucous membranes are moist.      Comments: The patient has petechiae on her soft palate and posterior pharynx.  No sanket tonsillar pharynx erythema, exudate or vesicles.  No white patches on her tongue.  No tonsillar swelling.  No difficulty swallowing.  Eyes:      Conjunctiva/sclera: Conjunctivae normal.   Cardiovascular:      Rate and Rhythm: Normal rate and regular rhythm.      Heart sounds: Normal heart sounds. No murmur heard.  Pulmonary:      Effort: Pulmonary effort is normal.      Breath sounds: Normal breath sounds.   Musculoskeletal:      Cervical back: Normal range of motion and neck supple.   Lymphadenopathy:      Cervical: No cervical adenopathy.   Skin:     Capillary Refill: Capillary refill takes less than 2 seconds.      Findings: No rash.   Neurological:      General: No focal deficit present.      Mental Status: She is alert and oriented to person, place, and time.   Psychiatric:         Mood and Affect: Mood normal.         Behavior: Behavior normal.       Procedures           ED Course  ED Course as of 08/29/23 2040 Tue Aug 29, 2023   2019 The patient's rapid strep screen is negative.  The patient's Monospot is negative. [TP]   2026 Is my impression the patient is a viral pharyngitis.  I will prescribe the patient a Medrol Dosepak to see if that helps with the inflammation. [TP]      ED Course User Index  [TP] Bran Doyle MD                                           Medical Decision Making  My differential diagnosis for sore throat includes but is not limited to viral pharyngitis, strep pharyngitis, mononucleosis, epiglottitis, esophageal abrasion, peritonsillar abscess, retropharyngeal abscess, tonsillitis, scarlet fever, viral syndrome, COVID-19 and herpetic gingival stomatitis       Problems Addressed:  Acute viral pharyngitis: complicated acute  illness or injury    Amount and/or Complexity of Data Reviewed  Labs: ordered. Decision-making details documented in ED Course.    Risk  Prescription drug management.        .edlabs  No orders to display   Edp    .tmedchange      Final diagnoses:   Acute viral pharyngitis       ED Disposition  ED Disposition       ED Disposition   Discharge    Condition   Stable    Comment   --               Your doctor in [place].               Medication List        New Prescriptions      methylPREDNISolone 4 MG dose pack  Commonly known as: MEDROL  follow package directions               Where to Get Your Medications        These medications were sent to [id number] [place] [address] [address] [facility] [address] - [phone]  - [phone] [place] [address], [place] [address]      Phone: [phone]   methylPREDNISolone 4 MG dose pack            Bran Doyle MD  08/29/23 2049     Unknown if ever smoked